# Patient Record
Sex: FEMALE | Race: WHITE | NOT HISPANIC OR LATINO
[De-identification: names, ages, dates, MRNs, and addresses within clinical notes are randomized per-mention and may not be internally consistent; named-entity substitution may affect disease eponyms.]

---

## 2017-02-16 ENCOUNTER — APPOINTMENT (OUTPATIENT)
Dept: UROGYNECOLOGY | Facility: CLINIC | Age: 67
End: 2017-02-16

## 2017-05-16 ENCOUNTER — OTHER (OUTPATIENT)
Age: 67
End: 2017-05-16

## 2017-05-23 LAB
25(OH)D3 SERPL-MCNC: 44.6 NG/ML
ALBUMIN SERPL ELPH-MCNC: 4 G/DL
ALP BLD-CCNC: 81 U/L
ALT SERPL-CCNC: 31 U/L
ANION GAP SERPL CALC-SCNC: 16 MMOL/L
AST SERPL-CCNC: 33 U/L
BASOPHILS # BLD AUTO: 0.03 K/UL
BASOPHILS NFR BLD AUTO: 0.4 %
BILIRUB SERPL-MCNC: 0.5 MG/DL
BUN SERPL-MCNC: 16 MG/DL
CALCIUM SERPL-MCNC: 9.5 MG/DL
CHLORIDE SERPL-SCNC: 103 MMOL/L
CHOLEST SERPL-MCNC: 164 MG/DL
CHOLEST/HDLC SERPL: 2.6 RATIO
CO2 SERPL-SCNC: 23 MMOL/L
CREAT SERPL-MCNC: 0.6 MG/DL
EOSINOPHIL # BLD AUTO: 0.12 K/UL
EOSINOPHIL NFR BLD AUTO: 1.8 %
GLUCOSE SERPL-MCNC: 85 MG/DL
HCT VFR BLD CALC: 45.7 %
HDLC SERPL-MCNC: 64 MG/DL
HGB BLD-MCNC: 14.8 G/DL
IMM GRANULOCYTES NFR BLD AUTO: 0.1 %
LDLC SERPL CALC-MCNC: 85 MG/DL
LYMPHOCYTES # BLD AUTO: 1.65 K/UL
LYMPHOCYTES NFR BLD AUTO: 24.3 %
MAN DIFF?: NORMAL
MCHC RBC-ENTMCNC: 29.7 PG
MCHC RBC-ENTMCNC: 32.4 GM/DL
MCV RBC AUTO: 91.6 FL
MONOCYTES # BLD AUTO: 0.71 K/UL
MONOCYTES NFR BLD AUTO: 10.5 %
NEUTROPHILS # BLD AUTO: 4.26 K/UL
NEUTROPHILS NFR BLD AUTO: 62.9 %
PLATELET # BLD AUTO: 284 K/UL
POTASSIUM SERPL-SCNC: 5.5 MMOL/L
PROT SERPL-MCNC: 7 G/DL
RBC # BLD: 4.99 M/UL
RBC # FLD: 13.4 %
SODIUM SERPL-SCNC: 142 MMOL/L
TRIGL SERPL-MCNC: 75 MG/DL
WBC # FLD AUTO: 6.78 K/UL

## 2017-06-12 ENCOUNTER — APPOINTMENT (OUTPATIENT)
Dept: CARDIOLOGY | Facility: CLINIC | Age: 67
End: 2017-06-12

## 2017-06-12 ENCOUNTER — NON-APPOINTMENT (OUTPATIENT)
Age: 67
End: 2017-06-12

## 2017-06-12 VITALS
HEART RATE: 91 BPM | SYSTOLIC BLOOD PRESSURE: 159 MMHG | HEIGHT: 65 IN | OXYGEN SATURATION: 98 % | DIASTOLIC BLOOD PRESSURE: 83 MMHG | BODY MASS INDEX: 29.07 KG/M2 | WEIGHT: 174.5 LBS

## 2017-06-12 VITALS — DIASTOLIC BLOOD PRESSURE: 70 MMHG | SYSTOLIC BLOOD PRESSURE: 120 MMHG

## 2017-06-12 DIAGNOSIS — I65.23 OCCLUSION AND STENOSIS OF BILATERAL CAROTID ARTERIES: ICD-10-CM

## 2017-09-05 ENCOUNTER — MEDICATION RENEWAL (OUTPATIENT)
Age: 67
End: 2017-09-05

## 2017-09-21 ENCOUNTER — MEDICATION RENEWAL (OUTPATIENT)
Age: 67
End: 2017-09-21

## 2018-01-23 ENCOUNTER — APPOINTMENT (OUTPATIENT)
Dept: CARDIOLOGY | Facility: CLINIC | Age: 68
End: 2018-01-23
Payer: MEDICARE

## 2018-01-23 ENCOUNTER — NON-APPOINTMENT (OUTPATIENT)
Age: 68
End: 2018-01-23

## 2018-01-23 VITALS
OXYGEN SATURATION: 98 % | SYSTOLIC BLOOD PRESSURE: 149 MMHG | HEIGHT: 65 IN | HEART RATE: 74 BPM | DIASTOLIC BLOOD PRESSURE: 86 MMHG

## 2018-01-23 VITALS — SYSTOLIC BLOOD PRESSURE: 130 MMHG | DIASTOLIC BLOOD PRESSURE: 72 MMHG

## 2018-01-23 PROCEDURE — 93000 ELECTROCARDIOGRAM COMPLETE: CPT

## 2018-01-23 PROCEDURE — 99215 OFFICE O/P EST HI 40 MIN: CPT

## 2018-01-24 LAB
25(OH)D3 SERPL-MCNC: 52.7 NG/ML
BASOPHILS # BLD AUTO: 0.04 K/UL
BASOPHILS NFR BLD AUTO: 0.5 %
EOSINOPHIL # BLD AUTO: 0.1 K/UL
EOSINOPHIL NFR BLD AUTO: 1.3 %
HCT VFR BLD CALC: 45.5 %
HGB BLD-MCNC: 15.3 G/DL
IMM GRANULOCYTES NFR BLD AUTO: 0.1 %
LYMPHOCYTES # BLD AUTO: 1.99 K/UL
LYMPHOCYTES NFR BLD AUTO: 26.8 %
MAN DIFF?: NORMAL
MCHC RBC-ENTMCNC: 30.2 PG
MCHC RBC-ENTMCNC: 33.6 GM/DL
MCV RBC AUTO: 89.7 FL
MONOCYTES # BLD AUTO: 0.6 K/UL
MONOCYTES NFR BLD AUTO: 8.1 %
NEUTROPHILS # BLD AUTO: 4.68 K/UL
NEUTROPHILS NFR BLD AUTO: 63.2 %
PLATELET # BLD AUTO: 252 K/UL
RBC # BLD: 5.07 M/UL
RBC # FLD: 13.5 %
WBC # FLD AUTO: 7.42 K/UL

## 2018-02-12 ENCOUNTER — LABORATORY RESULT (OUTPATIENT)
Age: 68
End: 2018-02-12

## 2018-05-21 ENCOUNTER — OTHER (OUTPATIENT)
Age: 68
End: 2018-05-21

## 2018-05-29 ENCOUNTER — RX RENEWAL (OUTPATIENT)
Age: 68
End: 2018-05-29

## 2018-05-31 ENCOUNTER — APPOINTMENT (OUTPATIENT)
Dept: CARDIOLOGY | Facility: CLINIC | Age: 68
End: 2018-05-31
Payer: MEDICARE

## 2018-05-31 PROCEDURE — 93224 XTRNL ECG REC UP TO 48 HRS: CPT

## 2018-06-11 ENCOUNTER — APPOINTMENT (OUTPATIENT)
Dept: CARDIOLOGY | Facility: CLINIC | Age: 68
End: 2018-06-11
Payer: MEDICARE

## 2018-06-11 VITALS — RESPIRATION RATE: 14 BRPM | DIASTOLIC BLOOD PRESSURE: 78 MMHG | HEART RATE: 76 BPM | SYSTOLIC BLOOD PRESSURE: 130 MMHG

## 2018-06-11 PROCEDURE — 99215 OFFICE O/P EST HI 40 MIN: CPT

## 2018-09-14 ENCOUNTER — RX RENEWAL (OUTPATIENT)
Age: 68
End: 2018-09-14

## 2018-09-24 ENCOUNTER — RX RENEWAL (OUTPATIENT)
Age: 68
End: 2018-09-24

## 2018-12-05 ENCOUNTER — OTHER (OUTPATIENT)
Age: 68
End: 2018-12-05

## 2018-12-11 ENCOUNTER — APPOINTMENT (OUTPATIENT)
Dept: CARDIOLOGY | Facility: CLINIC | Age: 68
End: 2018-12-11
Payer: MEDICARE

## 2018-12-11 ENCOUNTER — NON-APPOINTMENT (OUTPATIENT)
Age: 68
End: 2018-12-11

## 2018-12-11 VITALS
DIASTOLIC BLOOD PRESSURE: 95 MMHG | SYSTOLIC BLOOD PRESSURE: 167 MMHG | HEIGHT: 65 IN | OXYGEN SATURATION: 100 % | HEART RATE: 70 BPM

## 2018-12-11 VITALS — SYSTOLIC BLOOD PRESSURE: 140 MMHG | DIASTOLIC BLOOD PRESSURE: 90 MMHG

## 2018-12-11 DIAGNOSIS — F41.9 ANXIETY DISORDER, UNSPECIFIED: ICD-10-CM

## 2018-12-11 LAB
25(OH)D3 SERPL-MCNC: 56.1 NG/ML
ALBUMIN SERPL ELPH-MCNC: 4.3 G/DL
ALP BLD-CCNC: 90 U/L
ALT SERPL-CCNC: 27 U/L
ANION GAP SERPL CALC-SCNC: 12 MMOL/L
AST SERPL-CCNC: 21 U/L
BILIRUB SERPL-MCNC: 0.6 MG/DL
BUN SERPL-MCNC: 16 MG/DL
CALCIUM SERPL-MCNC: 10.1 MG/DL
CHLORIDE SERPL-SCNC: 101 MMOL/L
CHOLEST SERPL-MCNC: 177 MG/DL
CHOLEST/HDLC SERPL: 2.9 RATIO
CO2 SERPL-SCNC: 29 MMOL/L
CREAT SERPL-MCNC: 0.66 MG/DL
GLUCOSE SERPL-MCNC: 99 MG/DL
HDLC SERPL-MCNC: 62 MG/DL
LDLC SERPL CALC-MCNC: 94 MG/DL
POTASSIUM SERPL-SCNC: 5.1 MMOL/L
PROT SERPL-MCNC: 7 G/DL
SODIUM SERPL-SCNC: 142 MMOL/L
TRIGL SERPL-MCNC: 103 MG/DL

## 2018-12-11 PROCEDURE — 99215 OFFICE O/P EST HI 40 MIN: CPT

## 2018-12-11 PROCEDURE — 93000 ELECTROCARDIOGRAM COMPLETE: CPT

## 2018-12-11 RX ORDER — SERTRALINE HYDROCHLORIDE 50 MG/1
50 TABLET, FILM COATED ORAL DAILY
Refills: 0 | Status: ACTIVE | COMMUNITY

## 2018-12-11 NOTE — REASON FOR VISIT
[Hyperlipidemia] : hyperlipidemia [Hypertension] : hypertension [Palpitations] : palpitations [FreeTextEntry1] : ventricular ectopy

## 2018-12-11 NOTE — PHYSICAL EXAM
[General Appearance - In No Acute Distress] : no acute distress [Normal Conjunctiva] : the conjunctiva exhibited no abnormalities [No Oral Pallor] : no oral pallor [Normal Jugular Venous A Waves Present] : normal jugular venous A waves present [Normal Jugular Venous V Waves Present] : normal jugular venous V waves present [No Jugular Venous Womack A Waves] : no jugular venous womack A waves [Respiration, Rhythm And Depth] : normal respiratory rhythm and effort [Auscultation Breath Sounds / Voice Sounds] : lungs were clear to auscultation bilaterally [Bowel Sounds] : normal bowel sounds [Abdomen Tenderness] : non-tender [Abnormal Walk] : normal gait [Cyanosis, Localized] : no localized cyanosis [] : no rash [Oriented To Time, Place, And Person] : oriented to person, place, and time [Not Palpable] : not palpable [No Precordial Heave] : no precordial heave was noted [Normal Rate] : normal [Rhythm Regular] : regular [Normal S1] : normal S1 [Normal S2] : normal S2 [No Gallop] : no gallop heard [No Murmur] : no murmurs heard [2+] : left 2+ [No Abnormalities] : the abdominal aorta was not enlarged and no bruit was heard [No Pitting Edema] : no pitting edema present [FreeTextEntry1] : mildly overweight white female [Apical Thrill] : no thrill palpable at the apex [Click] : no click [Pericardial Rub] : no pericardial rub [Right Carotid Bruit] : no bruit heard over the right carotid [Left Carotid Bruit] : no bruit heard over the left carotid

## 2018-12-11 NOTE — DISCUSSION/SUMMARY
[FreeTextEntry1] : Mrs. Lowry has been stable from a cardiac symptomatic standpoint since her previous visit here on 6/11/18. Specifically, she has continued to experience randomly-occurring momentary palpitations, which have previously been found to correlate with supraventricular and/or ventricular ectopy. She attributes these palpitations to an increased degree of stress in her life. The patient has not experienced any signs or symptoms to suggest the development of a hemodynamically-compromising arrhythmia, an anginal syndrome, or congestive heart failure. Her cardiac examination today is unremarkable, and unchanged from her previous visit with me. Her blood pressure reading today is moderately elevated. Her electrocardiogram today reveals sinus rhythm with a right bundle branch block pattern, essentially unchanged from her previous office tracing.\par \par I have instructed the patient increase the dosage of Toprol-XL to 50 mg daily, in an effort to more optimally control her blood pressure, as well as to potentially suppress her palpitations. I have electronically prescribed the higher dosage of this medication to her pharmacy for her today, and I have asked her to call me if she should have any difficulty tolerating the increased dosage of this medication.\par \par I have again reassured the patient today that her palpitations appear to be related to supraventricular and/or ventricular premature contractions, which, in all likelihood, are benign in nature.\par \par I reviewed the findings of the cardiac rhythm event monitoring session of 12/29/15 through 1/4/16 and the Holter monitor study of 5/31/18 in detail with the patient today.\par \par I have reviewed the findings of the carotid artery Doppler study of 5/29/15 in detail with the patient.\par \par The persistent right bundle branch block pattern on the patient's electrocardiogram is considered a non-diagnostic finding in this patient, possibly representing an early manifestation of conduction system disease. Note that echocardiography did not demonstrate significant structural heart disease, and previous nuclear stress testing did not demonstrate evidence for underlying ischemic heart disease.\par \par The patient had follow-up blood testing performed in our office this morning, including a fasting lipid profile and chemistry screen, and I will telephone her to discuss the results, once they're available. In the interim, I have instructed her to continue Zocor at a dosage of 40 mg daily.\par \par The importance of proper dietary habits, weight loss, and regular exercise was again emphasized to the patient today.\par \par   I have asked the patient to call me if she should have any questions or problems pertaining to these matters, and especially if she should experience any concerning symptoms. I have otherwise asked her to return to the office for follow-up cardiac evaluation and blood pressure reassessment in one month, provided she remains clinically stable in the interim.

## 2018-12-11 NOTE — HISTORY OF PRESENT ILLNESS
[FreeTextEntry1] : Mrs. Flakita Lowry presented to the office today for follow-up cardiac evaluation.\par \par The patient is a 68-year-old female with a history of labile hypertension, a dyslipidemia, a right bundle branch block pattern on her electrocardiogram (discovered on a routine tracing performed at her internist's office on 5/26/11), mild carotid atherosclerosis, ventricular premature contractions, palpitations (suppressed with beta-blocker therapy), atypical chest discomfort (attributed to GERD), GERD, a hiatal hernia, insomnia, and vitamin D deficiency. I last evaluated the patient in the office on 6/11/18.\par \par The patient has been stable from a cardiac symptomatic standpoint since her previous visit here on 6/11/18. Specifically, she has continued to experience randomly-occurring momentary palpitations on a daily basis, which she attributes to stress and anxiety. She has not experienced any sustained episodes of palpitations. She has not experienced any episodes of presyncope or syncope. She has not experienced chest discomfort or dyspnea on exertion in association with her activities, which include walking 3 miles daily. She has not noted orthopnea, paroxysmal nocturnal dyspnea, or lower extremity edema.\par \par A Holter monitor study performed from 5/31/18 through 6/1/18 revealed the predominant rhythm throughout the recording to be sinus rhythm at an average rate of 76 beats per minute, with a range of  beats per minute. Rare supraventricular premature contractions were exhibited, including 4 couplets. 2 brief episodes of non-sustained supraventricular tachycardia were exhibited, the longest of which was 4 beats in duration. Rare ventricular premature contractions were exhibited. No episodes of ventricular tachycardia were exhibited. No significant pauses were exhibited.\par \par Laboratory studies performed on 2/12/18 revealed cholesterol 154, triglycerides 92, HDL 53, and calculated LDL 83. The liver chemistries were normal. The BUN and creatinine were 19 and 0.66, respectively. The potassium level was 5.1. The vitamin D level was 46.9. The hemoglobin and hematocrit were 14.3 and 43.4, respectively.\par \par Previous History:\par \par At the time of a previous visit here on 12/28/15, the patient reported that over the preceding few months, she had been experiencing recurrence of randomly-occurring palpitations, described as momentary "loud bumps". Subsequent cardiac rhythm event monitoring performed from 12/29/15 through 1/4/16 revealed palpitations on 12/29/15 to have correlated with isolated ventricular premature contractions. Note that I had instructed the patient to increase her dosage of Toprol-XL from 25 mg daily to 50 mg daily at the time of a previous visit here on 12/28/15, in an effort to suppress her palpitations, as well as to more optimally control her blood pressure. I spoke with her subsequently on the telephone on 1/19/17, at which time she informed me that she had become fatigued on the higher doses of Toprol-XL, and had reduced the dosage back down to 25 mg daily, with resolution of the fatigue. I instructed her to remain on Toprol-XL 25 mg daily at that point.\par \par When I had evaluated the patient in the office on 4/22/13, she described experiencing persistent palpitations at that time, described as a sensation of a "hard beat". Her blood pressure was noted to be persistently elevated at that time as well. I prescribed Toprol-XL 25 mg daily at bedtime, in an effort to more optimally control the patient's blood pressure, as well as to suppress her palpitations. Note that I suspected that the patient's palpitations were secondary to ventricular ectopy, as a ventricular premature contraction was noted on her electrocardiogram at the time of a previous visit here on 4/1/13.\par \par Echocardiography performed on 4/10/13 revealed normal cardiac structural integrity. There was normal left ventricular systolic function, with an estimated ejection fraction of 77%. There was evidence for mildly impaired diastolic relaxation the left ventricle. Mild tricuspid regurgitation was demonstrated, without significant elevation in the pulmonary artery systolic pressure.\par \par  Carotid artery Doppler testing performed in our office on 5/29/15 revealed mild plaque formation involving the right bulbar region.\par \par   As far as risk factors for coronary artery disease are concerned, the patient has a history of a type IIa dyslipidemia, being treated with statin therapy. She has exhibited labile hypertensive readings on occasion, however, states that she does not typically exhibit hypertension. She does not have a history of diabetes. She denies any history of cigarette smoking. She does not have a known definite immediate family history of premature coronary artery disease.\par \par A  nuclear stress study performed on 5/14/09, for further evaluation of atypical chest discomfort at that time, revealed the patient to exhibit normal exercise tolerance, without the inducement of cardiac symptoms. The electrocardiographic portion of the study was considered non-diagnostic. The cardiac imaging portion of the study revealed normal left ventricular myocardial perfusion and systolic function, with a calculated ejection fraction of 80%.\par \par   Past medical history, other than previously stated, is significant for vitamin D deficiency, left knee arthroscopic surgery for treatment of a torn meniscus in September of 2011, osteoarthritis of the left knee, a left Baker's cyst, a LEATHA/BSO procedure in the 1990's for treatment of uterine fibroids, resection of a benign left breast tumor in 1988, and a tonsillectomy at age 2. The patient has had 3 full-term uncomplicated pregnancies, all delivered vaginally.

## 2019-01-08 ENCOUNTER — APPOINTMENT (OUTPATIENT)
Dept: CARDIOLOGY | Facility: CLINIC | Age: 69
End: 2019-01-08
Payer: MEDICARE

## 2019-01-08 VITALS
OXYGEN SATURATION: 99 % | SYSTOLIC BLOOD PRESSURE: 154 MMHG | HEART RATE: 64 BPM | WEIGHT: 170 LBS | DIASTOLIC BLOOD PRESSURE: 82 MMHG | BODY MASS INDEX: 28.32 KG/M2 | HEIGHT: 65 IN

## 2019-01-08 VITALS — DIASTOLIC BLOOD PRESSURE: 80 MMHG | SYSTOLIC BLOOD PRESSURE: 150 MMHG

## 2019-01-08 PROCEDURE — 99215 OFFICE O/P EST HI 40 MIN: CPT

## 2019-01-08 NOTE — DISCUSSION/SUMMARY
[FreeTextEntry1] : Mrs. Lowry is clinically improved since her dosage of Toprol-XL was increased to 50 mg daily at the time of her previous visit here on 12/11/18, noting that her palpitations have significantly decreased in frequency, to the point where she now describes experiencing relatively infrequent momentary palpitations. The patient has not experienced any signs or symptoms to suggest the development of a hemodynamically-compromising arrhythmia, an anginal syndrome, or congestive heart failure. Her cardiac examination today is unremarkable, and unchanged from her previous visit with me. Her blood pressure reading today is moderately elevated. Her electrocardiogram at the time of her previous visit here on 12/11/18 revealed sinus rhythm with a right bundle branch block pattern, essentially unchanged from her previous office tracing.\par \par In an effort to more optimally control her blood pressure, I am going to add Benicar 20 mg daily to the patient's antihypertensive medication regimen. I have electronically prescribed this medication to her pharmacy for her today, and I have asked her to call me if she should have any difficulty tolerating the increased dosage of this medication. I have instructed her to continue Toprol-XL at a dosage of 50 mg daily for the time being.\par \par I have again reassured the patient today that her palpitations appear to be related to supraventricular and/or ventricular premature contractions, which, in all likelihood, are benign in nature.\par \par I reviewed the findings of the cardiac rhythm event monitoring session of 12/29/15 through 1/4/16 and the Holter monitor study of 5/31/18 in detail with the patient today.\par \par I have reviewed the findings of the carotid artery Doppler study of 5/29/15 in detail with the patient.\par \par The persistent right bundle branch block pattern on the patient's electrocardiogram is considered a non-diagnostic finding in this patient, possibly representing an early manifestation of conduction system disease. Note that echocardiography did not demonstrate significant structural heart disease, and previous nuclear stress testing did not demonstrate evidence for underlying ischemic heart disease.\par \par I have reviewed the findings of the blood test report on 12/11/18 in detail with the patient today, and I have instructed her to continue Zocor at a dosage of 40 mg daily for the time being.\par \par The importance of proper dietary habits, weight loss, and regular exercise was again emphasized to the patient today.\par \par   I have asked the patient to call me if she should have any questions or problems pertaining to these matters, and especially if she should experience any concerning symptoms. I have otherwise asked her to return to the office for follow-up cardiac evaluation and blood pressure reassessment in 3 weeks (prior to leaving to [place] for a period of 2 months), provided she remains clinically stable in the interim.

## 2019-01-08 NOTE — HISTORY OF PRESENT ILLNESS
[FreeTextEntry1] : Mrs. Flakita Lowry presented to the office today for follow-up cardiac evaluation.\par \par The patient is a 68-year-old female with a history of labile hypertension, a dyslipidemia, a right bundle branch block pattern on her electrocardiogram (discovered on a routine tracing performed at her internist's office on 5/26/11), mild carotid atherosclerosis, ventricular premature contractions, palpitations (suppressed with beta-blocker therapy), atypical chest discomfort (attributed to GERD), GERD, a hiatal hernia, insomnia, and vitamin D deficiency. I last evaluated the patient in the office on 12/11/18.\par \par At the time of the patient's previous visit here on 12/11/18, I instructed her to increase her dosage of Toprol-XL from 25 mg daily to 50 mg daily, in an effort to more optimally controlled her blood pressure, as well as to potentially suppress her palpitations. She has been tolerating the increased dosage of the Toprol-XL, and has noted her palpitations to have significantly decreased in frequency and duration.\par \par The patient has continued to experience randomly-occurring momentary palpitations (which she attributes to stress and anxiety), however, with significantly decreased frequency since the dosage of Toprol-XL was increased on 12/11/18, to the point where the palpitations are relatively infrequent. She has not experienced any sustained episodes of palpitations. She has not experienced any episodes of presyncope or syncope. She has not experienced chest discomfort or dyspnea on exertion in association with her activities, which include walking 3 miles daily. She has not noted orthopnea, paroxysmal nocturnal dyspnea, or lower extremity edema.\par \par A Holter monitor study performed from 5/31/18 through 6/1/18 revealed the predominant rhythm throughout the recording to be sinus rhythm at an average rate of 76 beats per minute, with a range of  beats per minute. Rare supraventricular premature contractions were exhibited, including 4 couplets. 2 brief episodes of non-sustained supraventricular tachycardia were exhibited, the longest of which was 4 beats in duration. Rare ventricular premature contractions were exhibited. No episodes of ventricular tachycardia were exhibited. No significant pauses were exhibited.\par \par Laboratory studies performed on 12/11/18 revealed cholesterol 177, triglycerides 103, HDL 62, and calculated LDL 94. The liver chemistries were normal. The BUN and creatinine were 16 and 0.66, respectively. The potassium level was 5.1. The glucose level was 99. The vitamin D level was 56.1.\par \par Previous History:\par \par At the time of a previous visit here on 12/28/15, the patient reported that over the preceding few months, she had been experiencing recurrence of randomly-occurring palpitations, described as momentary "loud bumps". Subsequent cardiac rhythm event monitoring performed from 12/29/15 through 1/4/16 revealed palpitations on 12/29/15 to have correlated with isolated ventricular premature contractions. Note that I had instructed the patient to increase her dosage of Toprol-XL from 25 mg daily to 50 mg daily at the time of a previous visit here on 12/28/15, in an effort to suppress her palpitations, as well as to more optimally control her blood pressure. I spoke with her subsequently on the telephone on 1/19/17, at which time she informed me that she had become fatigued on the higher doses of Toprol-XL, and had reduced the dosage back down to 25 mg daily, with resolution of the fatigue. I instructed her to remain on Toprol-XL 25 mg daily at that point.\par \par When I had evaluated the patient in the office on 4/22/13, she described experiencing persistent palpitations at that time, described as a sensation of a "hard beat". Her blood pressure was noted to be persistently elevated at that time as well. I prescribed Toprol-XL 25 mg daily at bedtime, in an effort to more optimally control the patient's blood pressure, as well as to suppress her palpitations. Note that I suspected that the patient's palpitations were secondary to ventricular ectopy, as a ventricular premature contraction was noted on her electrocardiogram at the time of a previous visit here on 4/1/13.\par \par Echocardiography performed on 4/10/13 revealed normal cardiac structural integrity. There was normal left ventricular systolic function, with an estimated ejection fraction of 77%. There was evidence for mildly impaired diastolic relaxation the left ventricle. Mild tricuspid regurgitation was demonstrated, without significant elevation in the pulmonary artery systolic pressure.\par \par  Carotid artery Doppler testing performed in our office on 5/29/15 revealed mild plaque formation involving the right bulbar region.\par \par   As far as risk factors for coronary artery disease are concerned, the patient has a history of a type IIa dyslipidemia, being treated with statin therapy. She has exhibited labile hypertensive readings on occasion, however, states that she does not typically exhibit hypertension. She does not have a history of diabetes. She denies any history of cigarette smoking. She does not have a known definite immediate family history of premature coronary artery disease.\par \par A  nuclear stress study performed on 5/14/09, for further evaluation of atypical chest discomfort at that time, revealed the patient to exhibit normal exercise tolerance, without the inducement of cardiac symptoms. The electrocardiographic portion of the study was considered non-diagnostic. The cardiac imaging portion of the study revealed normal left ventricular myocardial perfusion and systolic function, with a calculated ejection fraction of 80%.\par \par   Past medical history, other than previously stated, is significant for vitamin D deficiency, left knee arthroscopic surgery for treatment of a torn meniscus in September of 2011, osteoarthritis of the left knee, a left Baker's cyst, a LEATHA/BSO procedure in the 1990's for treatment of uterine fibroids, resection of a benign left breast tumor in 1988, and a tonsillectomy at age 2. The patient has had 3 full-term uncomplicated pregnancies, all delivered vaginally.

## 2019-01-29 ENCOUNTER — APPOINTMENT (OUTPATIENT)
Dept: CARDIOLOGY | Facility: CLINIC | Age: 69
End: 2019-01-29

## 2019-01-29 ENCOUNTER — APPOINTMENT (OUTPATIENT)
Dept: UROGYNECOLOGY | Facility: CLINIC | Age: 69
End: 2019-01-29
Payer: MEDICARE

## 2019-01-29 DIAGNOSIS — M62.89 OTHER SPECIFIED DISORDERS OF MUSCLE: ICD-10-CM

## 2019-01-29 PROCEDURE — 99213 OFFICE O/P EST LOW 20 MIN: CPT

## 2019-01-29 NOTE — HISTORY OF PRESENT ILLNESS
[FreeTextEntry1] : Patient with pelvic pain cured with skelaxin.  Referral provided.  exam unchanged from previous.  Risks and adverse reactions of meds reviewed.   Patient will likely continue care in [place].

## 2019-01-30 ENCOUNTER — APPOINTMENT (OUTPATIENT)
Dept: CARDIOLOGY | Facility: CLINIC | Age: 69
End: 2019-01-30
Payer: MEDICARE

## 2019-01-30 VITALS
OXYGEN SATURATION: 95 % | HEIGHT: 65 IN | HEART RATE: 70 BPM | SYSTOLIC BLOOD PRESSURE: 148 MMHG | DIASTOLIC BLOOD PRESSURE: 76 MMHG

## 2019-01-30 PROCEDURE — 99215 OFFICE O/P EST HI 40 MIN: CPT

## 2019-01-30 NOTE — DISCUSSION/SUMMARY
[FreeTextEntry1] : Mrs. Lowry is clinically improved since her dosage of Toprol-XL was increased to 50 mg daily at the time of her visit here on 12/11/18, noting that her palpitations have significantly decreased in frequency, to the point where she now describes experiencing relatively infrequent momentary palpitations. The patient has not experienced any signs or symptoms to suggest the development of a hemodynamically-compromising arrhythmia, an anginal syndrome, or congestive heart failure. Her cardiac examination today is unremarkable, and unchanged from her previous visit with me. Her blood pressure reading today is mildly elevated. Her electrocardiogram at the time of a previous visit here on 12/11/18 revealed sinus rhythm with a right bundle branch block pattern, essentially unchanged from her previous office tracing.\par \par In an effort to more optimally control her blood pressure, I have instructed the patient to increase the dosage of Benicar to 40 mg daily. I have electronically prescribed the higher dosage of this medication to her pharmacy for her today, and I have asked her to call me if she should have any difficulty tolerating the increased dosage of this medication. I have instructed her to continue Toprol-XL at a dosage of 50 mg daily for the time being.\par \par I have again reassured the patient today that her palpitations appear to be related to supraventricular and/or ventricular premature contractions, which, in all likelihood, are benign in nature.\par \par I reviewed the findings of the cardiac rhythm event monitoring session of 12/29/15 through 1/4/16 and the Holter monitor study of 5/31/18 in detail with the patient today.\par \par I have reviewed the findings of the carotid artery Doppler study of 5/29/15 in detail with the patient.\par \par The persistent right bundle branch block pattern on the patient's electrocardiogram is considered a non-diagnostic finding in this patient, possibly representing an early manifestation of conduction system disease. Note that echocardiography did not demonstrate significant structural heart disease, and previous nuclear stress testing did not demonstrate evidence for underlying ischemic heart disease.\par \par I have reviewed the findings of the blood test report on 12/11/18 in detail with the patient today, and I have instructed her to continue Zocor at a dosage of 40 mg daily for the time being.\par \par The importance of proper dietary habits, weight loss, and regular exercise was again emphasized to the patient today.\par \par   I have asked the patient to call me if she should have any questions or problems pertaining to these matters, and especially if she should experience any concerning symptoms. I have otherwise asked her to return to the office for follow-up cardiac evaluation and blood pressure reassessment in 2 months (after returning from [place]), provided she remains clinically stable in the interim.

## 2019-01-30 NOTE — HISTORY OF PRESENT ILLNESS
[FreeTextEntry1] : Mrs. Flakita Lowry presented to the office today for follow-up cardiac evaluation.\par \par The patient is a 68-year-old female with a history of labile hypertension, a dyslipidemia, a right bundle branch block pattern on her electrocardiogram (discovered on a routine tracing performed at her internist's office on 5/26/11), mild carotid atherosclerosis, ventricular premature contractions, palpitations (suppressed with beta-blocker therapy), atypical chest discomfort (attributed to GERD), GERD, a hiatal hernia, insomnia, and vitamin D deficiency. I last evaluated the patient in the office on 1/8/19.\par \par At the time of a previous visit here on 12/11/18, I instructed the patient to increase her dosage of Toprol-XL from 25 mg daily to 50 mg daily, in an effort to more optimally control her blood pressure, as well as to potentially suppress her palpitations. She has been tolerating the increased dosage of the Toprol-XL, and has noted her palpitations to have significantly decreased in frequency and duration.  At the time of a follow-up visit here on 1/8/19, I added Benicar 20 mg daily to the patient's medication regimen, in an effort to control her blood pressure. Review of her home blood pressure readings since her previous visit here reveals the readings to have been labile, however, predominately running in the mildly elevated range.\par \par The patient has continued to experience randomly-occurring momentary palpitations (which she attributes to stress and anxiety), however, with significantly decreased frequency since the dosage of Toprol-XL was increased on 12/11/18, to the point where the palpitations are relatively infrequent. She has not experienced any sustained episodes of palpitations. She has not experienced any episodes of presyncope or syncope. She has not experienced chest discomfort or dyspnea on exertion in association with her activities, which include walking 3 miles daily. She has not noted orthopnea, paroxysmal nocturnal dyspnea, or lower extremity edema.\par \par A Holter monitor study performed from 5/31/18 through 6/1/18 revealed the predominant rhythm throughout the recording to be sinus rhythm at an average rate of 76 beats per minute, with a range of  beats per minute. Rare supraventricular premature contractions were exhibited, including 4 couplets. 2 brief episodes of non-sustained supraventricular tachycardia were exhibited, the longest of which was 4 beats in duration. Rare ventricular premature contractions were exhibited. No episodes of ventricular tachycardia were exhibited. No significant pauses were exhibited.\par \par Laboratory studies performed on 12/11/18 revealed cholesterol 177, triglycerides 103, HDL 62, and calculated LDL 94. The liver chemistries were normal. The BUN and creatinine were 16 and 0.66, respectively. The potassium level was 5.1. The glucose level was 99. The vitamin D level was 56.1.\par \par Previous History:\par \par At the time of a previous visit here on 12/28/15, the patient reported that over the preceding few months, she had been experiencing recurrence of randomly-occurring palpitations, described as momentary "loud bumps". Subsequent cardiac rhythm event monitoring performed from 12/29/15 through 1/4/16 revealed palpitations on 12/29/15 to have correlated with isolated ventricular premature contractions. Note that I had instructed the patient to increase her dosage of Toprol-XL from 25 mg daily to 50 mg daily at the time of a previous visit here on 12/28/15, in an effort to suppress her palpitations, as well as to more optimally control her blood pressure. I spoke with her subsequently on the telephone on 1/19/17, at which time she informed me that she had become fatigued on the higher doses of Toprol-XL, and had reduced the dosage back down to 25 mg daily, with resolution of the fatigue. I instructed her to remain on Toprol-XL 25 mg daily at that point.\par \par When I had evaluated the patient in the office on 4/22/13, she described experiencing persistent palpitations at that time, described as a sensation of a "hard beat". Her blood pressure was noted to be persistently elevated at that time as well. I prescribed Toprol-XL 25 mg daily at bedtime, in an effort to more optimally control the patient's blood pressure, as well as to suppress her palpitations. Note that I suspected that the patient's palpitations were secondary to ventricular ectopy, as a ventricular premature contraction was noted on her electrocardiogram at the time of a previous visit here on 4/1/13.\par \par Echocardiography performed on 4/10/13 revealed normal cardiac structural integrity. There was normal left ventricular systolic function, with an estimated ejection fraction of 77%. There was evidence for mildly impaired diastolic relaxation the left ventricle. Mild tricuspid regurgitation was demonstrated, without significant elevation in the pulmonary artery systolic pressure.\par \par  Carotid artery Doppler testing performed in our office on 5/29/15 revealed mild plaque formation involving the right bulbar region.\par \par   As far as risk factors for coronary artery disease are concerned, the patient has a history of a type IIa dyslipidemia, being treated with statin therapy. She has exhibited labile hypertensive readings on occasion, however, states that she does not typically exhibit hypertension. She does not have a history of diabetes. She denies any history of cigarette smoking. She does not have a known definite immediate family history of premature coronary artery disease.\par \par A  nuclear stress study performed on 5/14/09, for further evaluation of atypical chest discomfort at that time, revealed the patient to exhibit normal exercise tolerance, without the inducement of cardiac symptoms. The electrocardiographic portion of the study was considered non-diagnostic. The cardiac imaging portion of the study revealed normal left ventricular myocardial perfusion and systolic function, with a calculated ejection fraction of 80%.\par \par   Past medical history, other than previously stated, is significant for vitamin D deficiency, left knee arthroscopic surgery for treatment of a torn meniscus in September of 2011, osteoarthritis of the left knee, a left Baker's cyst, a LEATHA/BSO procedure in the 1990's for treatment of uterine fibroids, resection of a benign left breast tumor in 1988, and a tonsillectomy at age 2. The patient has had 3 full-term uncomplicated pregnancies, all delivered vaginally.

## 2019-04-30 ENCOUNTER — NON-APPOINTMENT (OUTPATIENT)
Age: 69
End: 2019-04-30

## 2019-04-30 ENCOUNTER — APPOINTMENT (OUTPATIENT)
Dept: CARDIOLOGY | Facility: CLINIC | Age: 69
End: 2019-04-30
Payer: MEDICARE

## 2019-04-30 VITALS
HEART RATE: 67 BPM | DIASTOLIC BLOOD PRESSURE: 84 MMHG | SYSTOLIC BLOOD PRESSURE: 152 MMHG | BODY MASS INDEX: 28.32 KG/M2 | HEIGHT: 65 IN | WEIGHT: 170 LBS | OXYGEN SATURATION: 98 %

## 2019-04-30 VITALS — DIASTOLIC BLOOD PRESSURE: 80 MMHG | SYSTOLIC BLOOD PRESSURE: 130 MMHG

## 2019-04-30 PROCEDURE — 93000 ELECTROCARDIOGRAM COMPLETE: CPT

## 2019-04-30 PROCEDURE — 99215 OFFICE O/P EST HI 40 MIN: CPT

## 2019-04-30 NOTE — HISTORY OF PRESENT ILLNESS
[FreeTextEntry1] : Mrs. Flakita Lowry presented to the office today for follow-up cardiac evaluation.\par \par The patient is a 69-year-old female with a history of labile hypertension, a dyslipidemia, a right bundle branch block pattern on her electrocardiogram (discovered on a routine tracing performed at her internist's office on 5/26/11), mild carotid atherosclerosis, ventricular premature contractions, palpitations (suppressed with beta-blocker therapy), atypical chest discomfort (attributed to GERD), GERD, a hiatal hernia, insomnia, and vitamin D deficiency. I last evaluated the patient in the office on 1/30/19.\par \par At the time of a previous visit here on 12/11/18, I instructed the patient to increase her dosage of Toprol-XL from 25 mg daily to 50 mg daily, in an effort to more optimally control her blood pressure, as well as to potentially suppress her palpitations. She has been tolerating the increased dosage of the Toprol-XL, and has noted her palpitations to have significantly decreased in frequency and duration.  At the time of a follow-up visit here on 1/8/19, I added Benicar 20 mg daily to the patient's medication regimen, in an effort to control her blood pressure. \par \par The dosage of Benicar was increased to 40 mg daily at the time of a follow-up visit here on 1/30/19, noting that the patient was exhibiting a moderately elevated blood pressure reading at that time.\par \par The patient tripped on her front stoop on 3/14/19, lacerating her knee. She required suturing, and when she presented for suturing, she was noted to have a blood pressure reading of 112/43. She telephoned our office, and spoke with my associate, Dr. Smith, who instructed her to reduce the dosage of Benicar to 20 mg daily at that point.\par \par The patient has been doing well from a cardiac symptomatic standpoint since her previous visit here in 1/30/19. Specifically, she has not experienced recurrence of previously reported momentary palpitations. She has not experienced any sustained episodes of palpitations. She has not experienced chest discomfort or dyspnea on exertion in association with her activities, which include walking 3 miles daily. She has not noted orthopnea, paroxysmal nocturnal dyspnea, or lower extremity edema. She has not experienced any episodes of presyncope or syncope.\par \par A Holter monitor study performed from 5/31/18 through 6/1/18 revealed the predominant rhythm throughout the recording to be sinus rhythm at an average rate of 76 beats per minute, with a range of  beats per minute. Rare supraventricular premature contractions were exhibited, including 4 couplets. 2 brief episodes of non-sustained supraventricular tachycardia were exhibited, the longest of which was 4 beats in duration. Rare ventricular premature contractions were exhibited. No episodes of ventricular tachycardia were exhibited. No significant pauses were exhibited.\par \par Laboratory studies performed on 12/11/18 revealed cholesterol 177, triglycerides 103, HDL 62, and calculated LDL 94. The liver chemistries were normal. The BUN and creatinine were 16 and 0.66, respectively. The potassium level was 5.1. The glucose level was 99. The vitamin D level was 56.1.\par \par Previous History:\par \par At the time of a previous visit here on 12/28/15, the patient reported that over the preceding few months, she had been experiencing recurrence of randomly-occurring palpitations, described as momentary "loud bumps". Subsequent cardiac rhythm event monitoring performed from 12/29/15 through 1/4/16 revealed palpitations on 12/29/15 to have correlated with isolated ventricular premature contractions. Note that I had instructed the patient to increase her dosage of Toprol-XL from 25 mg daily to 50 mg daily at the time of a previous visit here on 12/28/15, in an effort to suppress her palpitations, as well as to more optimally control her blood pressure. I spoke with her subsequently on the telephone on 1/19/17, at which time she informed me that she had become fatigued on the higher doses of Toprol-XL, and had reduced the dosage back down to 25 mg daily, with resolution of the fatigue. I instructed her to remain on Toprol-XL 25 mg daily at that point.\par \par When I had evaluated the patient in the office on 4/22/13, she described experiencing persistent palpitations at that time, described as a sensation of a "hard beat". Her blood pressure was noted to be persistently elevated at that time as well. I prescribed Toprol-XL 25 mg daily at bedtime, in an effort to more optimally control the patient's blood pressure, as well as to suppress her palpitations. Note that I suspected that the patient's palpitations were secondary to ventricular ectopy, as a ventricular premature contraction was noted on her electrocardiogram at the time of a previous visit here on 4/1/13.\par \par Echocardiography performed on 4/10/13 revealed normal cardiac structural integrity. There was normal left ventricular systolic function, with an estimated ejection fraction of 77%. There was evidence for mildly impaired diastolic relaxation the left ventricle. Mild tricuspid regurgitation was demonstrated, without significant elevation in the pulmonary artery systolic pressure.\par \par  Carotid artery Doppler testing performed in our office on 5/29/15 revealed mild plaque formation involving the right bulbar region.\par \par   As far as risk factors for coronary artery disease are concerned, the patient has a history of a type IIa dyslipidemia, being treated with statin therapy. She has exhibited labile hypertensive readings on occasion, however, states that she does not typically exhibit hypertension. She does not have a history of diabetes. She denies any history of cigarette smoking. She does not have a known definite immediate family history of premature coronary artery disease.\par \par A  nuclear stress study performed on 5/14/09, for further evaluation of atypical chest discomfort at that time, revealed the patient to exhibit normal exercise tolerance, without the inducement of cardiac symptoms. The electrocardiographic portion of the study was considered non-diagnostic. The cardiac imaging portion of the study revealed normal left ventricular myocardial perfusion and systolic function, with a calculated ejection fraction of 80%.\par \par   Past medical history, other than previously stated, is significant for vitamin D deficiency, left knee arthroscopic surgery for treatment of a torn meniscus in September of 2011, osteoarthritis of the left knee, a left Baker's cyst, a LEATHA/BSO procedure in the 1990's for treatment of uterine fibroids, resection of a benign left breast tumor in 1988, and a tonsillectomy at age 2. The patient has had 3 full-term uncomplicated pregnancies, all delivered vaginally.

## 2019-04-30 NOTE — DISCUSSION/SUMMARY
[FreeTextEntry1] : Mrs. Lowry is clinically improved since her dosage of Toprol-XL was increased to 50 mg daily at the time of a visit here on 12/11/18, noting that her palpitations have significantly decreased in frequency, to the point where she reports not having experienced recurrence of palpitations since her previous visit here on 1/30/19. The patient has not experienced any signs or symptoms to suggest the development of a hemodynamically-compromising arrhythmia, an anginal syndrome, or congestive heart failure. Her cardiac examination today is unremarkable, and unchanged from her previous visit with me. Her blood pressure reading was moderately elevated upon initial presentation to the office today, however, a follow-up measurement obtained at her examination revealed the rhythm to be satisfactory. Her echocardiogram today reveals sinus rhythm at a rate of 64 beats per minute with a right bundle branch block pattern, essentially unchanged from her previous office tracing.\par \par As her blood pressure reading today is satisfactory, I instructed the patient to continue Benicar at a dosage of 20 mg daily for the time being, as well as Toprol-XL 50 mg daily.\par \par I have again reassured the patient today that her palpitations appear to be related to supraventricular and/or ventricular premature contractions, which, in all likelihood, are benign in nature.\par \par I reviewed the findings of the cardiac rhythm event monitoring session of 12/29/15 through 1/4/16 and the Holter monitor study of 5/31/18 in detail with the patient today.\par \par I have reviewed the findings of the carotid artery Doppler study of 5/29/15 in detail with the patient.\par \par The persistent right bundle branch block pattern on the patient's electrocardiogram is considered a non-diagnostic finding in this patient, possibly representing an early manifestation of conduction system disease. Note that echocardiography did not demonstrate significant structural heart disease, and previous nuclear stress testing did not demonstrate evidence for underlying ischemic heart disease.\par \par I have reviewed the findings of the blood test report on 12/11/18 in detail with the patient today, and I have instructed her to continue Zocor at a dosage of 40 mg daily for the time being. 5 the blood testing will be planned for June of 2019 for reassessment.\par \par The importance of proper dietary habits, weight loss, and regular exercise was again emphasized to the patient today.\par \par   I have asked the patient to call me if she should have any questions or problems pertaining to these matters, and especially if she should experience any concerning symptoms. I have otherwise asked her to return to the office for follow-up cardiac evaluation and blood pressure reassessment in 6 months, provided she remains clinically stable in the interim.

## 2019-06-19 ENCOUNTER — APPOINTMENT (OUTPATIENT)
Dept: CARDIOLOGY | Facility: CLINIC | Age: 69
End: 2019-06-19
Payer: MEDICARE

## 2019-06-19 ENCOUNTER — NON-APPOINTMENT (OUTPATIENT)
Age: 69
End: 2019-06-19

## 2019-06-19 VITALS
HEART RATE: 65 BPM | BODY MASS INDEX: 28.32 KG/M2 | OXYGEN SATURATION: 96 % | HEIGHT: 65 IN | WEIGHT: 170 LBS | DIASTOLIC BLOOD PRESSURE: 80 MMHG | SYSTOLIC BLOOD PRESSURE: 155 MMHG

## 2019-06-19 PROCEDURE — 99215 OFFICE O/P EST HI 40 MIN: CPT

## 2019-06-19 PROCEDURE — 93000 ELECTROCARDIOGRAM COMPLETE: CPT

## 2019-06-19 NOTE — DISCUSSION/SUMMARY
[FreeTextEntry1] : Mrs. Lowry is clinically improved since her dosage of Toprol-XL was increased to 50 mg daily at the time of a visit here on 12/11/18, noting that her palpitations have significantly decreased in frequency, to the point where she reports not having experienced recurrence of palpitations since her previous visit here on 4/30/19. The patient has not experienced any signs or symptoms to suggest the development of a hemodynamically-compromising arrhythmia, an anginal syndrome, or congestive heart failure. Her cardiac examination today is unremarkable, and unchanged from her previous visit with me. Her blood pressure reading was moderately elevated upon initial presentation to the office today, however, a follow-up measurement obtained at her examination revealed the rhythm to be normal. Her electrocardiogram today reveals sinus rhythm at a rate of 65 beats per minute with a right bundle branch block pattern and non-diagnostic repolarization abnormalities, essentially unchanged from her previous office tracing.\par \par As her blood pressure reading today is normal, I have instructed the patient to continue Benicar at a dosage of 20 mg daily for the time being, as well as Toprol-XL 50 mg daily.\par \par I have again reassured the patient today that her palpitations appear to be related to supraventricular and/or ventricular premature contractions, which, in all likelihood, are benign in nature.\par \par I reviewed the findings of the cardiac rhythm event monitoring session of 12/29/15 through 1/4/16 and the Holter monitor study of 5/31/18 in detail with the patient today.\par \par I have reviewed the findings of the carotid artery Doppler study of 5/29/15 in detail with the patient.\par \par The persistent right bundle branch block pattern on the patient's electrocardiogram is considered a non-diagnostic finding in this patient, possibly representing an early manifestation of conduction system disease. Note that echocardiography did not demonstrate significant structural heart disease, and previous nuclear stress testing did not demonstrate evidence for underlying ischemic heart disease.\par \par I have reviewed the findings of the blood test report on 12/11/18 in detail with the patient today, and I have instructed her to continue Zocor at a dosage of 40 mg daily for the time being. She is going to be having a follow-up fasting lipid profile and chemistry screen performed in the near future for reassessment.\par \par The importance of proper dietary habits, weight loss, and regular exercise was again emphasized to the patient today.\par \par   I have asked the patient to call me if she should have any questions or problems pertaining to these matters, and especially if she should experience any concerning symptoms. I have otherwise asked her to return to the office for follow-up cardiac evaluation and blood pressure reassessment in 6 months, provided she remains clinically stable in the interim.

## 2019-06-19 NOTE — PHYSICAL EXAM
[General Appearance - In No Acute Distress] : no acute distress [No Oral Pallor] : no oral pallor [Normal Conjunctiva] : the conjunctiva exhibited no abnormalities [No Jugular Venous Womack A Waves] : no jugular venous womack A waves [Normal Jugular Venous V Waves Present] : normal jugular venous V waves present [Normal Jugular Venous A Waves Present] : normal jugular venous A waves present [Respiration, Rhythm And Depth] : normal respiratory rhythm and effort [Auscultation Breath Sounds / Voice Sounds] : lungs were clear to auscultation bilaterally [Abdomen Tenderness] : non-tender [Bowel Sounds] : normal bowel sounds [Abnormal Walk] : normal gait [Cyanosis, Localized] : no localized cyanosis [] : no rash [Not Palpable] : not palpable [No Precordial Heave] : no precordial heave was noted [Oriented To Time, Place, And Person] : oriented to person, place, and time [Normal Rate] : normal [Rhythm Regular] : regular [Normal S1] : normal S1 [Normal S2] : normal S2 [No Gallop] : no gallop heard [No Murmur] : no murmurs heard [2+] : left 2+ [No Abnormalities] : the abdominal aorta was not enlarged and no bruit was heard [No Pitting Edema] : no pitting edema present [FreeTextEntry1] : mildly overweight white female [Apical Thrill] : no thrill palpable at the apex [Click] : no click [Pericardial Rub] : no pericardial rub [Left Carotid Bruit] : no bruit heard over the left carotid [Right Carotid Bruit] : no bruit heard over the right carotid

## 2019-06-21 LAB
25(OH)D3 SERPL-MCNC: 58.8 NG/ML
ALBUMIN SERPL ELPH-MCNC: 4.6 G/DL
ALP BLD-CCNC: 76 U/L
ALT SERPL-CCNC: 22 U/L
ANION GAP SERPL CALC-SCNC: 11 MMOL/L
AST SERPL-CCNC: 18 U/L
BILIRUB SERPL-MCNC: 0.4 MG/DL
BUN SERPL-MCNC: 21 MG/DL
CALCIUM SERPL-MCNC: 10.3 MG/DL
CHLORIDE SERPL-SCNC: 102 MMOL/L
CHOLEST SERPL-MCNC: 196 MG/DL
CHOLEST/HDLC SERPL: 3 RATIO
CO2 SERPL-SCNC: 29 MMOL/L
CREAT SERPL-MCNC: 0.73 MG/DL
GLUCOSE SERPL-MCNC: 94 MG/DL
HDLC SERPL-MCNC: 65 MG/DL
LDLC SERPL CALC-MCNC: 110 MG/DL
POTASSIUM SERPL-SCNC: 5.2 MMOL/L
PROT SERPL-MCNC: 7.3 G/DL
SODIUM SERPL-SCNC: 142 MMOL/L
TRIGL SERPL-MCNC: 106 MG/DL

## 2019-10-15 ENCOUNTER — APPOINTMENT (OUTPATIENT)
Dept: CARDIOLOGY | Facility: CLINIC | Age: 69
End: 2019-10-15
Payer: MEDICARE

## 2019-10-15 ENCOUNTER — NON-APPOINTMENT (OUTPATIENT)
Age: 69
End: 2019-10-15

## 2019-10-15 VITALS
DIASTOLIC BLOOD PRESSURE: 84 MMHG | SYSTOLIC BLOOD PRESSURE: 133 MMHG | WEIGHT: 170 LBS | HEIGHT: 65 IN | OXYGEN SATURATION: 100 % | HEART RATE: 59 BPM | BODY MASS INDEX: 28.32 KG/M2

## 2019-10-15 PROCEDURE — 93000 ELECTROCARDIOGRAM COMPLETE: CPT

## 2019-10-15 PROCEDURE — 99215 OFFICE O/P EST HI 40 MIN: CPT

## 2019-10-15 NOTE — DISCUSSION/SUMMARY
[FreeTextEntry1] : Mrs. Lowry has recently noted symptoms of fatigue, dyspnea on exertion, and palpitations. I suspect that her symptoms may be stress or anxiety-related, especially her palpitations, noting bad she stated that she was experiencing palpitations while I was examining her, and at the time, her heart rhythm and rate were normal. Her cardiac examination today is unremarkable, and unchanged from her previous visit with me. Her blood pressure reading today is normal. Her electrocardiogram today reveals sinus rhythm at a rate of 65 beats per minute with a right bundle branch block pattern and non-diagnostic repolarization abnormalities, essentially unchanged from her previous office tracing.\par \par As her blood pressure reading today is normal, I have instructed the patient to continue Benicar 20 mg daily and Toprol-XL 50 mg daily for the time being.\par \par The persistent right bundle branch block pattern on the patient's electrocardiogram is considered a non-diagnostic finding in this patient, possibly representing an early manifestation of conduction system disease. Note that echocardiography did not demonstrate significant structural heart disease, and previous nuclear stress testing did not demonstrate evidence for underlying ischemic heart disease.\par \par In view of her recent symptoms of fatigue and dyspnea on exertion, I am referring the patient for nuclear stress testing to evaluate for the presence of exercise-induced myocardial ischemia and/or arrhythmias. In addition, echocardiography will be performed to evaluate left ventricular function and valvular function. The patient is going to make arrangements to have these studies performed through our office, and I will telephone her to discuss the findings, once the studies have been completed.\par \par I have reviewed the findings of the blood test report on 10/1/19 in detail with the patient today, and I have instructed her to continue Zocor at a dosage of 40 mg daily for the time being.\par \par The importance of proper dietary habits, weight loss, and regular exercise was again emphasized to the patient today.\par \par   I have asked the patient to call me if she should have any questions or problems pertaining to these matters, and especially if she should experience any concerning symptoms. Further recommendations regarding cardiac evaluation and/or treatment will be considered, pending the patient's clinical course, as well as the findings on the upcoming cardiac studies.

## 2019-10-15 NOTE — PHYSICAL EXAM
[General Appearance - In No Acute Distress] : no acute distress [Normal Conjunctiva] : the conjunctiva exhibited no abnormalities [No Oral Pallor] : no oral pallor [Normal Jugular Venous V Waves Present] : normal jugular venous V waves present [Normal Jugular Venous A Waves Present] : normal jugular venous A waves present [No Jugular Venous Womack A Waves] : no jugular venous womack A waves [Respiration, Rhythm And Depth] : normal respiratory rhythm and effort [Auscultation Breath Sounds / Voice Sounds] : lungs were clear to auscultation bilaterally [Bowel Sounds] : normal bowel sounds [Abnormal Walk] : normal gait [Cyanosis, Localized] : no localized cyanosis [Abdomen Tenderness] : non-tender [Not Palpable] : not palpable [Oriented To Time, Place, And Person] : oriented to person, place, and time [] : no rash [No Precordial Heave] : no precordial heave was noted [Normal Rate] : normal [Normal S1] : normal S1 [Normal S2] : normal S2 [Rhythm Regular] : regular [No Gallop] : no gallop heard [No Murmur] : no murmurs heard [2+] : right 2+ [No Abnormalities] : the abdominal aorta was not enlarged and no bruit was heard [No Pitting Edema] : no pitting edema present [FreeTextEntry1] : mildly overweight white female [Apical Thrill] : no thrill palpable at the apex [Click] : no click [Right Carotid Bruit] : no bruit heard over the right carotid [Pericardial Rub] : no pericardial rub [Left Carotid Bruit] : no bruit heard over the left carotid

## 2019-10-15 NOTE — REASON FOR VISIT
[Hyperlipidemia] : hyperlipidemia [Hypertension] : hypertension [Palpitations] : palpitations [Dyspnea] : dyspnea [FreeTextEntry1] : ventricular ectopy

## 2019-10-15 NOTE — HISTORY OF PRESENT ILLNESS
[FreeTextEntry1] : Mrs. Flakita Lowry presented to the office today for follow-up cardiac evaluation. I last evaluated the patient in the office on 6/19/19.\par \par The patient is a 69-year-old female with a history of labile hypertension, a dyslipidemia, a right bundle branch block pattern on her electrocardiogram (discovered on a routine tracing performed at her internist's office on 5/26/11), mild carotid atherosclerosis, ventricular premature contractions, palpitations (suppressed with beta-blocker therapy), atypical chest discomfort (attributed to GERD), GERD, a hiatal hernia, nephrolithiasis, osteoarthritis, insomnia, and vitamin D deficiency.\par \par The patient underwent a left ureteral stenting procedure a few weeks ago for treatment of nephrolithiasis. Following the procedure, she noted loss of appetite, fatigue, and dyspnea on exertion. The stent was removed on 10/10/19, and the patient recently noted improvement in her symptoms, however, she is still noted a degree of fatigue and dyspnea on exertion. She does not describe having experienced any symptoms of chest discomfort in association with her activities. She has not noted orthopnea, paroxysmal nocturnal dyspnea, or lower extremity edema. Over the past few weeks, she has been aware of intermittently experiencing palpitations, described as a sensation of tachycardia, particularly when lying quietly in a supine position. She has not experienced any sustained episodes of palpitations. She has not experienced any episodes of presyncope or syncope.\par \par Laboratory studies performed on 10/1/19 revealed cholesterol 143, triglycerides 147, HDL 46, and calculated LDL 68. The liver chemistries were normal. The glucose level was 103. The BUN and creatinine were 18 and 0.82, respectively. The potassium level was 5.3. The hemoglobin and hematocrit were 13.5 and 40.1, respectively.\par \par A Holter monitor study performed from 5/31/18 through 6/1/18 revealed the predominant rhythm throughout the recording to be sinus rhythm at an average rate of 76 beats per minute, with a range of  beats per minute. Rare supraventricular premature contractions were exhibited, including 4 couplets. 2 brief episodes of non-sustained supraventricular tachycardia were exhibited, the longest of which was 4 beats in duration. Rare ventricular premature contractions were exhibited. No episodes of ventricular tachycardia were exhibited. No significant pauses were exhibited.\par \par Echocardiography performed on 4/10/13 revealed normal cardiac structural integrity. There was normal left ventricular systolic function, with an estimated ejection fraction of 77%. There was evidence for mildly impaired diastolic relaxation the left ventricle. Mild tricuspid regurgitation was demonstrated, without significant elevation in the pulmonary artery systolic pressure.\par \par  Carotid artery Doppler testing performed in our office on 5/29/15 revealed mild plaque formation involving the right bulbar region.\par \par A  nuclear stress study performed on 5/14/09, for further evaluation of atypical chest discomfort at that time, revealed the patient to exhibit normal exercise tolerance, without the inducement of cardiac symptoms. The electrocardiographic portion of the study was considered non-diagnostic. The cardiac imaging portion of the study revealed normal left ventricular myocardial perfusion and systolic function, with a calculated ejection fraction of 80%.\par \par Previous History:\par \par At the time of a previous visit here on 12/11/18, I instructed the patient to increase her dosage of Toprol-XL from 25 mg daily to 50 mg daily, in an effort to more optimally control her blood pressure, as well as to potentially suppress her palpitations. She has been tolerating the increased dosage of the Toprol-XL, and has noted her palpitations to have significantly decreased in frequency and duration.  At the time of a follow-up visit here on 1/8/19, I added Benicar 20 mg daily to the patient's medication regimen, in an effort to control her blood pressure. The dosage of Benicar was increased to 40 mg daily at the time of a follow-up visit here on 1/30/19, noting that the patient was exhibiting a moderately elevated blood pressure reading at that time. The patient tripped on her front stoop on 3/14/19, lacerating her knee. She required suturing, and when she presented for suturing, she was noted to have a blood pressure reading of 112/43. She telephoned our office, and spoke with my associate, Dr. Smith, who instructed her to reduce the dosage of Benicar to 20 mg daily at that point.\par \par At the time of a previous visit here on 12/28/15, the patient reported that over the preceding few months, she had been experiencing recurrence of randomly-occurring palpitations, described as momentary "loud bumps". Subsequent cardiac rhythm event monitoring performed from 12/29/15 through 1/4/16 revealed palpitations on 12/29/15 to have correlated with isolated ventricular premature contractions. Note that I had instructed the patient to increase her dosage of Toprol-XL from 25 mg daily to 50 mg daily at the time of a previous visit here on 12/28/15, in an effort to suppress her palpitations, as well as to more optimally control her blood pressure. I spoke with her subsequently on the telephone on 1/19/17, at which time she informed me that she had become fatigued on the higher doses of Toprol-XL, and had reduced the dosage back down to 25 mg daily, with resolution of the fatigue. I instructed her to remain on Toprol-XL 25 mg daily at that point.\par \par When I had evaluated the patient in the office on 4/22/13, she described experiencing persistent palpitations at that time, described as a sensation of a "hard beat". Her blood pressure was noted to be persistently elevated at that time as well. I prescribed Toprol-XL 25 mg daily at bedtime, in an effort to more optimally control the patient's blood pressure, as well as to suppress her palpitations. Note that I suspected that the patient's palpitations were secondary to ventricular ectopy, as a ventricular premature contraction was noted on her electrocardiogram at the time of a previous visit here on 4/1/13.\par \par   As far as risk factors for coronary artery disease are concerned, the patient has a history of a type IIa dyslipidemia, being treated with statin therapy. She has exhibited labile hypertensive readings on occasion, however, states that she does not typically exhibit hypertension. She does not have a history of diabetes. She denies any history of cigarette smoking. She does not have a known definite immediate family history of premature coronary artery disease.\par \par   Past medical history, other than previously stated, is significant for vitamin D deficiency, left knee arthroscopic surgery for treatment of a torn meniscus in September of 2011, osteoarthritis of the left knee, a left Baker's cyst, a LEATHA/BSO procedure in the 1990's for treatment of uterine fibroids, resection of a benign left breast tumor in 1988, and a tonsillectomy at age 2. The patient has had 3 full-term uncomplicated pregnancies, all delivered vaginally.

## 2019-10-28 ENCOUNTER — APPOINTMENT (OUTPATIENT)
Dept: CARDIOLOGY | Facility: CLINIC | Age: 69
End: 2019-10-28
Payer: MEDICARE

## 2019-10-28 PROCEDURE — 93306 TTE W/DOPPLER COMPLETE: CPT

## 2019-11-15 ENCOUNTER — APPOINTMENT (OUTPATIENT)
Dept: CARDIOLOGY | Facility: CLINIC | Age: 69
End: 2019-11-15

## 2020-05-08 ENCOUNTER — RX RENEWAL (OUTPATIENT)
Age: 70
End: 2020-05-08

## 2020-10-14 ENCOUNTER — APPOINTMENT (OUTPATIENT)
Dept: CARDIOLOGY | Facility: CLINIC | Age: 70
End: 2020-10-14
Payer: MEDICARE

## 2020-10-14 ENCOUNTER — NON-APPOINTMENT (OUTPATIENT)
Age: 70
End: 2020-10-14

## 2020-10-14 VITALS
DIASTOLIC BLOOD PRESSURE: 65 MMHG | HEIGHT: 65 IN | HEART RATE: 63 BPM | BODY MASS INDEX: 28.32 KG/M2 | WEIGHT: 170 LBS | OXYGEN SATURATION: 96 % | SYSTOLIC BLOOD PRESSURE: 115 MMHG

## 2020-10-14 DIAGNOSIS — R06.00 DYSPNEA, UNSPECIFIED: ICD-10-CM

## 2020-10-14 DIAGNOSIS — I07.1 RHEUMATIC TRICUSPID INSUFFICIENCY: ICD-10-CM

## 2020-10-14 PROCEDURE — 99215 OFFICE O/P EST HI 40 MIN: CPT

## 2020-10-14 PROCEDURE — 93000 ELECTROCARDIOGRAM COMPLETE: CPT

## 2020-10-14 RX ORDER — OMEPRAZOLE 20 MG/1
20 CAPSULE, DELAYED RELEASE ORAL
Refills: 3 | Status: ACTIVE | COMMUNITY

## 2020-10-14 RX ORDER — METAXALONE 800 MG/1
800 TABLET ORAL
Qty: 90 | Refills: 3 | Status: DISCONTINUED | COMMUNITY
Start: 2019-01-29 | End: 2020-10-14

## 2020-10-14 RX ORDER — CIMETIDINE 400 MG
400 TABLET ORAL TWICE DAILY
Refills: 0 | Status: DISCONTINUED | COMMUNITY
End: 2020-10-14

## 2020-11-03 ENCOUNTER — RX RENEWAL (OUTPATIENT)
Age: 70
End: 2020-11-03

## 2020-12-08 ENCOUNTER — APPOINTMENT (OUTPATIENT)
Dept: CARDIOLOGY | Facility: CLINIC | Age: 70
End: 2020-12-08

## 2020-12-10 ENCOUNTER — RX RENEWAL (OUTPATIENT)
Age: 70
End: 2020-12-10

## 2021-01-21 ENCOUNTER — APPOINTMENT (OUTPATIENT)
Dept: DISASTER EMERGENCY | Facility: CLINIC | Age: 71
End: 2021-01-21

## 2021-02-04 ENCOUNTER — APPOINTMENT (OUTPATIENT)
Dept: DISASTER EMERGENCY | Facility: CLINIC | Age: 71
End: 2021-02-04

## 2021-03-04 ENCOUNTER — APPOINTMENT (OUTPATIENT)
Dept: CARDIOLOGY | Facility: CLINIC | Age: 71
End: 2021-03-04
Payer: MEDICARE

## 2021-03-04 PROCEDURE — 93306 TTE W/DOPPLER COMPLETE: CPT

## 2021-04-27 ENCOUNTER — RX RENEWAL (OUTPATIENT)
Age: 71
End: 2021-04-27

## 2021-06-03 ENCOUNTER — APPOINTMENT (OUTPATIENT)
Dept: DISASTER EMERGENCY | Facility: CLINIC | Age: 71
End: 2021-06-03

## 2021-06-17 ENCOUNTER — APPOINTMENT (OUTPATIENT)
Dept: DISASTER EMERGENCY | Facility: CLINIC | Age: 71
End: 2021-06-17

## 2021-11-08 ENCOUNTER — APPOINTMENT (OUTPATIENT)
Dept: CARDIOLOGY | Facility: CLINIC | Age: 71
End: 2021-11-08
Payer: MEDICARE

## 2021-11-08 VITALS
OXYGEN SATURATION: 96 % | BODY MASS INDEX: 28.16 KG/M2 | HEART RATE: 82 BPM | SYSTOLIC BLOOD PRESSURE: 137 MMHG | WEIGHT: 169 LBS | HEIGHT: 65 IN | DIASTOLIC BLOOD PRESSURE: 73 MMHG

## 2021-11-08 LAB
ANION GAP SERPL CALC-SCNC: 13 MMOL/L
BUN SERPL-MCNC: 23 MG/DL
CALCIUM SERPL-MCNC: 10.4 MG/DL
CHLORIDE SERPL-SCNC: 99 MMOL/L
CO2 SERPL-SCNC: 28 MMOL/L
CREAT SERPL-MCNC: 0.95 MG/DL
GLUCOSE SERPL-MCNC: 100 MG/DL
POTASSIUM SERPL-SCNC: 4.8 MMOL/L
SODIUM SERPL-SCNC: 141 MMOL/L

## 2021-11-08 PROCEDURE — 99215 OFFICE O/P EST HI 40 MIN: CPT

## 2021-11-08 PROCEDURE — 93000 ELECTROCARDIOGRAM COMPLETE: CPT

## 2021-11-14 ENCOUNTER — OUTPATIENT (OUTPATIENT)
Dept: OUTPATIENT SERVICES | Facility: HOSPITAL | Age: 71
LOS: 1 days | End: 2021-11-14
Payer: MEDICARE

## 2021-11-14 DIAGNOSIS — Z11.52 ENCOUNTER FOR SCREENING FOR COVID-19: ICD-10-CM

## 2021-11-14 LAB — SARS-COV-2 RNA SPEC QL NAA+PROBE: SIGNIFICANT CHANGE UP

## 2021-11-14 PROCEDURE — U0003: CPT

## 2021-11-14 PROCEDURE — C9803: CPT

## 2021-11-14 PROCEDURE — U0005: CPT

## 2021-11-16 ENCOUNTER — OUTPATIENT (OUTPATIENT)
Dept: OUTPATIENT SERVICES | Facility: HOSPITAL | Age: 71
LOS: 1 days | End: 2021-11-16
Payer: MEDICARE

## 2021-11-16 VITALS
SYSTOLIC BLOOD PRESSURE: 132 MMHG | OXYGEN SATURATION: 98 % | HEART RATE: 72 BPM | RESPIRATION RATE: 18 BRPM | DIASTOLIC BLOOD PRESSURE: 58 MMHG

## 2021-11-16 VITALS
WEIGHT: 169.98 LBS | TEMPERATURE: 100 F | RESPIRATION RATE: 18 BRPM | HEART RATE: 68 BPM | DIASTOLIC BLOOD PRESSURE: 68 MMHG | SYSTOLIC BLOOD PRESSURE: 146 MMHG | OXYGEN SATURATION: 98 % | HEIGHT: 64 IN

## 2021-11-16 DIAGNOSIS — R06.00 DYSPNEA, UNSPECIFIED: ICD-10-CM

## 2021-11-16 DIAGNOSIS — Z90.710 ACQUIRED ABSENCE OF BOTH CERVIX AND UTERUS: Chronic | ICD-10-CM

## 2021-11-16 LAB
ALBUMIN SERPL ELPH-MCNC: 4.2 G/DL — SIGNIFICANT CHANGE UP (ref 3.3–5)
ALP SERPL-CCNC: 87 U/L — SIGNIFICANT CHANGE UP (ref 40–120)
ALT FLD-CCNC: 24 U/L — SIGNIFICANT CHANGE UP (ref 10–45)
ANION GAP SERPL CALC-SCNC: 10 MMOL/L — SIGNIFICANT CHANGE UP (ref 5–17)
AST SERPL-CCNC: 26 U/L — SIGNIFICANT CHANGE UP (ref 10–40)
BILIRUB SERPL-MCNC: 0.5 MG/DL — SIGNIFICANT CHANGE UP (ref 0.2–1.2)
BUN SERPL-MCNC: 28 MG/DL — HIGH (ref 7–23)
CALCIUM SERPL-MCNC: 9.9 MG/DL — SIGNIFICANT CHANGE UP (ref 8.4–10.5)
CHLORIDE SERPL-SCNC: 106 MMOL/L — SIGNIFICANT CHANGE UP (ref 96–108)
CO2 SERPL-SCNC: 27 MMOL/L — SIGNIFICANT CHANGE UP (ref 22–31)
CREAT SERPL-MCNC: 0.69 MG/DL — SIGNIFICANT CHANGE UP (ref 0.5–1.3)
GLUCOSE SERPL-MCNC: 97 MG/DL — SIGNIFICANT CHANGE UP (ref 70–99)
HCT VFR BLD CALC: 43.5 % — SIGNIFICANT CHANGE UP (ref 34.5–45)
HGB BLD-MCNC: 14.5 G/DL — SIGNIFICANT CHANGE UP (ref 11.5–15.5)
MCHC RBC-ENTMCNC: 30.2 PG — SIGNIFICANT CHANGE UP (ref 27–34)
MCHC RBC-ENTMCNC: 33.3 GM/DL — SIGNIFICANT CHANGE UP (ref 32–36)
MCV RBC AUTO: 90.6 FL — SIGNIFICANT CHANGE UP (ref 80–100)
NRBC # BLD: 0 /100 WBCS — SIGNIFICANT CHANGE UP (ref 0–0)
PLATELET # BLD AUTO: 234 K/UL — SIGNIFICANT CHANGE UP (ref 150–400)
POTASSIUM SERPL-MCNC: 4.5 MMOL/L — SIGNIFICANT CHANGE UP (ref 3.5–5.3)
POTASSIUM SERPL-SCNC: 4.5 MMOL/L — SIGNIFICANT CHANGE UP (ref 3.5–5.3)
PROT SERPL-MCNC: 7.1 G/DL — SIGNIFICANT CHANGE UP (ref 6–8.3)
RBC # BLD: 4.8 M/UL — SIGNIFICANT CHANGE UP (ref 3.8–5.2)
RBC # FLD: 12.3 % — SIGNIFICANT CHANGE UP (ref 10.3–14.5)
SODIUM SERPL-SCNC: 143 MMOL/L — SIGNIFICANT CHANGE UP (ref 135–145)
WBC # BLD: 7.44 K/UL — SIGNIFICANT CHANGE UP (ref 3.8–10.5)
WBC # FLD AUTO: 7.44 K/UL — SIGNIFICANT CHANGE UP (ref 3.8–10.5)

## 2021-11-16 PROCEDURE — C1769: CPT

## 2021-11-16 PROCEDURE — 93460 R&L HRT ART/VENTRICLE ANGIO: CPT | Mod: 26

## 2021-11-16 PROCEDURE — 93460 R&L HRT ART/VENTRICLE ANGIO: CPT

## 2021-11-16 PROCEDURE — 80053 COMPREHEN METABOLIC PANEL: CPT

## 2021-11-16 PROCEDURE — C1887: CPT

## 2021-11-16 PROCEDURE — 99152 MOD SED SAME PHYS/QHP 5/>YRS: CPT

## 2021-11-16 PROCEDURE — 93010 ELECTROCARDIOGRAM REPORT: CPT

## 2021-11-16 PROCEDURE — 93005 ELECTROCARDIOGRAM TRACING: CPT

## 2021-11-16 PROCEDURE — 85027 COMPLETE CBC AUTOMATED: CPT

## 2021-11-16 PROCEDURE — 99236 HOSP IP/OBS SAME DATE HI 85: CPT

## 2021-11-16 PROCEDURE — C1889: CPT

## 2021-11-16 PROCEDURE — C1894: CPT

## 2021-11-16 RX ORDER — FUROSEMIDE 40 MG
1 TABLET ORAL
Qty: 0 | Refills: 0 | DISCHARGE

## 2021-11-16 NOTE — H&P CARDIOLOGY - NSICDXPASTMEDICALHX_GEN_ALL_CORE_FT
PAST MEDICAL HISTORY:  GERD (gastroesophageal reflux disease)     H/O valvular heart disease     Hiatal hernia     HLD (hyperlipidemia)     HTN (hypertension)     Pulmonary HTN

## 2021-11-16 NOTE — CHART NOTE - NSCHARTNOTEFT_GEN_A_CORE
called to evaluate femoral access site by RN  on palpation patient has discomfort above access site area slightly firm  Manually compressed hematoma now softer and less tender  VSS  Pt bedrest time adjusted   d/w Dr. Zavala

## 2021-11-16 NOTE — ASU DISCHARGE PLAN (ADULT/PEDIATRIC) - CARE PROVIDER_API CALL
Pedro Landin (MD)  Cardiovascular Disease; Internal Medicine  [address] [place] [address]  Phone: [phone]  Fax: [phone]  Established Patient  Follow Up Time: 2 weeks

## 2021-11-16 NOTE — ASU DISCHARGE PLAN (ADULT/PEDIATRIC) - ASU DC SPECIAL INSTRUCTIONSFT
Wound Care:   the day AFTER your procedure remove bandage GENTLY, and clean using  mild soap and gentle warm, water stream, pat dry. leave OPEN to air. YOU MAY SHOWER   DO NOT apply lotions, creams, ointments, powder, parfumes to your incision site  DO NOT SOAK your site for 1 week ( no baths, no pools, no tubs, etc...)  Check  your groin and /or wrist daily. A small amount of bruising, and soarness are normal  ACTIVITY: for 24 hours   - DO NOT DRIVE  - DO NOT make any important decisions or sign legal documents   - DO NOT operate heavy machineries   - you may resume sexual activity in 48 hours, unless otherwise instructed by your cardiologist   If your procedure was done through the WRIST: for the NEXT 3DAYS:  - avoid pushing, pulling, with that affected wrist   - avoid repeated movement of that hand and wrist ( eg: typing, hammering)  - DO NOT LIFT anything more than 5 lbs   If your procedure was done through the GROIN: for the NEXT 5 DAYS  - Limit climbing stairs, DO NOT soak in bathtub or pool  - no strenuous activities, pushing, pulling, straining  - Do not lift anything 10lbs or heavier   MEDICATION:   take your medications as explained ( see discharge paperwork)   If you received a STENT, you will be taking antiplatelet medications to KEEP YOUR STENT OPEN ( eg: Aspirin, Plavix, Brilinta, Effient, etc).  Take as prescribed DO NOT STOP taking them without consulting with your cardiologist first.   Follow heart healthy diet recommended by your doctor, , if you smoke STOP SMOKING ( may call [phone] for center of tobacco control if you need assistance)   CALL your doctor to make appointment in 2 WEEKS   ***CALL YOUR DOCTOR***  if you experience: fever, chills, body aches, or severe pain, swelling, redness, heat or yellow discharge at incision site  If you experience Bleeding or excruciating pain at the procedural site, swelling ( golf ball size) at your procedural site  If you experience CHEST PAIN  If you experience extremity numbness, tingling, temperature change ( of your procedural site)   If you are unable to reach your doctor, you may contact:   -Cardiology Office at [facility] at [phone] or   - [facility] [phone]  - [facility] [phone]

## 2021-11-16 NOTE — H&P CARDIOLOGY - HISTORY OF PRESENT ILLNESS
This is a 70 yo female PMH GERD, hiatal hernia, nephrolithiasis, osteoarthritis, labile HTN, HLD, RBBB, mild AI, mild TR, moderate PHTN, PVCs, palpitations with recently noted dyspnea on exertion and weight gain. Her evaluation in the emergency room at [facility] on 11/5/2021 was remarkable for an elevated PBNP level at 952, however, a chest x-ray revealed no active disease and an echocardiogram revealed normal left ventricular systolic function. She was treated with Lasix, and over the past few days, has lost the 9 pounds that she had gained over the preceding 2 weeks. She has continued to note a similar degree of dyspnea on exertion.  Pt. denies dizziness, diaphoresis, nausea, vomiting, peripheral edema, or syncope.  No implanted monitoring devices. Pt. presents for further evaluation and RHC/LHC.                     This is a 70 yo female PMH GERD, hiatal hernia, nephrolithiasis, osteoarthritis, labile HTN, HLD, RBBB, mild AI, mild TR, moderate PHTN, PVCs, palpitations with recently noted dyspnea on exertion and weight gain. Her evaluation in the emergency room at [facility] on 11/5/2021 was remarkable for an elevated PBNP level at 952, however, a chest x-ray revealed no active disease and an echocardiogram revealed normal left ventricular systolic function. She was treated with Lasix, and over the past few days, has lost the 9 pounds that she had gained over the preceding 2 weeks. She has continued to note a similar degree of dyspnea on exertion.  Pt. denies dizziness, diaphoresis, nausea, vomiting, peripheral edema, or syncope.  No implanted monitoring devices. TTE 3/4/21 EF 65. mild to mod TR, mild AR RV systolic pressure 52. Pt. presents for further evaluation and RHC/LHC.                     This is a 72 yo female PMH GERD, hiatal hernia, nephrolithiasis, osteoarthritis, labile HTN, HLD, RBBB, mild AI, mild TR, moderate PHTN, PVCs, palpitations with recently noted dyspnea on exertion and weight gain. Her evaluation in the emergency room at [facility] on 11/5/2021 was remarkable for an elevated PBNP level at 952, however, a chest x-ray revealed no active disease and an echocardiogram revealed normal left ventricular systolic function. She was treated with Lasix, and over the past few days, has lost the 9 pounds that she had gained over the preceding 2 weeks. She has continued to note a similar degree of dyspnea on exertion.  Pt. denies dizziness, diaphoresis, nausea, vomiting, peripheral edema, or syncope.  No implanted monitoring devices. TTE 3/4/21 EF 65. mild to mod TR, mild AR RV systolic pressure 52. Pt. referred for further evaluation and RHC/LHC by DR. Ladnin.

## 2021-11-20 RX ORDER — FUROSEMIDE 40 MG/1
40 TABLET ORAL DAILY
Refills: 0 | Status: DISCONTINUED | COMMUNITY
End: 2021-11-20

## 2021-12-07 ENCOUNTER — APPOINTMENT (OUTPATIENT)
Dept: CARDIOLOGY | Facility: CLINIC | Age: 71
End: 2021-12-07
Payer: MEDICARE

## 2021-12-07 ENCOUNTER — NON-APPOINTMENT (OUTPATIENT)
Age: 71
End: 2021-12-07

## 2021-12-07 VITALS — HEART RATE: 66 BPM | SYSTOLIC BLOOD PRESSURE: 145 MMHG | DIASTOLIC BLOOD PRESSURE: 72 MMHG | OXYGEN SATURATION: 99 %

## 2021-12-07 DIAGNOSIS — R06.00 DYSPNEA, UNSPECIFIED: ICD-10-CM

## 2021-12-07 DIAGNOSIS — R79.89 OTHER SPECIFIED ABNORMAL FINDINGS OF BLOOD CHEMISTRY: ICD-10-CM

## 2021-12-07 DIAGNOSIS — Z87.898 PERSONAL HISTORY OF OTHER SPECIFIED CONDITIONS: ICD-10-CM

## 2021-12-07 PROCEDURE — 99215 OFFICE O/P EST HI 40 MIN: CPT

## 2021-12-07 PROCEDURE — 93000 ELECTROCARDIOGRAM COMPLETE: CPT

## 2022-01-18 ENCOUNTER — RX RENEWAL (OUTPATIENT)
Age: 72
End: 2022-01-18

## 2022-03-27 PROBLEM — I27.20 PULMONARY HYPERTENSION, UNSPECIFIED: Chronic | Status: ACTIVE | Noted: 2021-11-16

## 2022-03-27 PROBLEM — K21.9 GASTRO-ESOPHAGEAL REFLUX DISEASE WITHOUT ESOPHAGITIS: Chronic | Status: ACTIVE | Noted: 2021-11-16

## 2022-03-27 PROBLEM — I10 ESSENTIAL (PRIMARY) HYPERTENSION: Chronic | Status: ACTIVE | Noted: 2021-11-16

## 2022-03-27 PROBLEM — E78.5 HYPERLIPIDEMIA, UNSPECIFIED: Chronic | Status: ACTIVE | Noted: 2021-11-16

## 2022-03-27 PROBLEM — Z86.79 PERSONAL HISTORY OF OTHER DISEASES OF THE CIRCULATORY SYSTEM: Chronic | Status: ACTIVE | Noted: 2021-11-16

## 2022-03-27 PROBLEM — K44.9 DIAPHRAGMATIC HERNIA WITHOUT OBSTRUCTION OR GANGRENE: Chronic | Status: ACTIVE | Noted: 2021-11-16

## 2022-04-05 ENCOUNTER — APPOINTMENT (OUTPATIENT)
Dept: ORTHOPEDIC SURGERY | Facility: CLINIC | Age: 72
End: 2022-04-05
Payer: MEDICARE

## 2022-04-05 DIAGNOSIS — M43.17 SPONDYLOLISTHESIS, LUMBOSACRAL REGION: ICD-10-CM

## 2022-04-05 PROCEDURE — 72100 X-RAY EXAM L-S SPINE 2/3 VWS: CPT

## 2022-04-05 PROCEDURE — 20610 DRAIN/INJ JOINT/BURSA W/O US: CPT | Mod: LT

## 2022-04-05 PROCEDURE — 72170 X-RAY EXAM OF PELVIS: CPT

## 2022-04-05 PROCEDURE — 73562 X-RAY EXAM OF KNEE 3: CPT | Mod: LT

## 2022-04-05 PROCEDURE — 99214 OFFICE O/P EST MOD 30 MIN: CPT | Mod: 25

## 2022-04-05 RX ORDER — METHYLPREDNISOLONE 4 MG/1
4 TABLET ORAL
Qty: 1 | Refills: 0 | Status: ACTIVE | COMMUNITY
Start: 2022-04-05 | End: 1900-01-01

## 2022-04-05 NOTE — HISTORY OF PRESENT ILLNESS
[Gradual] : gradual [de-identified] : 4-5-22 Known b/l knee oa. did well with orthovisc injections in 2020. Over the last few months pain has returned worse on the left knee but on the right as well she has been ambulating with a limp\par \par we have also seen her for her lower back pain which has become more problematic. she has right sided symptoms with radicular complaints down the right leg into the foot worse with prolonged standing and walking.  [FreeTextEntry1] : b/l knees and lower back [FreeTextEntry5] : back pain has gotten worse

## 2022-04-05 NOTE — PHYSICAL EXAM
[5___] : hamstring 5[unfilled]/5 [Equivocal] : equivocal Althea [] : mildly antalgic [Disc space narrowing] : Disc space narrowing [Spondylolithesis] : Spondylolithesis [Bilateral] : knee bilaterally [AP] : anteroposterior [Lateral] : lateral [[place]] : skyline [Degenerative change] : Degenerative change [de-identified] : she notes tingling/radicular pain down the right leg anterior to the shin [FreeTextEntry1] : 5-1 narrowing and stenosis, 4-5 spondylolisthesis [de-identified] : negative [de-identified] : the left knee is more symptomatic than the right [TWNoteComboBox7] : flexion 120 degrees [de-identified] : extension 3 degrees [FreeTextEntry9] : right knee mild , left knee severe medial narrowing

## 2022-04-05 NOTE — DISCUSSION/SUMMARY
[de-identified] : will administer csi today to her left knee as it is more problematic. see her next week to start orthovisc series of 4 both knees\par \par due to continued radicular pain lumbar down the right leg that has failed conservative measures will get mri f/u and will likely refer to pain mgt for lesi

## 2022-04-06 ENCOUNTER — APPOINTMENT (OUTPATIENT)
Dept: MRI IMAGING | Facility: CLINIC | Age: 72
End: 2022-04-06
Payer: MEDICARE

## 2022-04-06 PROCEDURE — 72148 MRI LUMBAR SPINE W/O DYE: CPT | Mod: MH

## 2022-04-12 ENCOUNTER — APPOINTMENT (OUTPATIENT)
Dept: ORTHOPEDIC SURGERY | Facility: CLINIC | Age: 72
End: 2022-04-12
Payer: MEDICARE

## 2022-04-12 VITALS — BODY MASS INDEX: 26.66 KG/M2 | HEIGHT: 65 IN | WEIGHT: 160 LBS

## 2022-04-12 DIAGNOSIS — M48.061 SPINAL STENOSIS, LUMBAR REGION WITHOUT NEUROGENIC CLAUDICATION: ICD-10-CM

## 2022-04-12 DIAGNOSIS — S20.212A CONTUSION OF LEFT FRONT WALL OF THORAX, INITIAL ENCOUNTER: ICD-10-CM

## 2022-04-12 PROCEDURE — 99213 OFFICE O/P EST LOW 20 MIN: CPT | Mod: 25

## 2022-04-12 PROCEDURE — 99214 OFFICE O/P EST MOD 30 MIN: CPT | Mod: 25

## 2022-04-12 PROCEDURE — 20610 DRAIN/INJ JOINT/BURSA W/O US: CPT | Mod: LT

## 2022-04-12 PROCEDURE — 71100 X-RAY EXAM RIBS UNI 2 VIEWS: CPT

## 2022-04-12 RX ORDER — GABAPENTIN 100 MG/1
100 CAPSULE ORAL
Qty: 30 | Refills: 0 | Status: ACTIVE | COMMUNITY
Start: 2022-04-12 | End: 1900-01-01

## 2022-04-12 NOTE — DISCUSSION/SUMMARY
[de-identified] : MRI reviewed, will try Gabapentin at night, if not helpful, may need to consider Pain Management\par IMPRESSION:\par Multilevel lumbar degenerative disc disease and facet osteoarthrosis with grade I degenerative spondylolisthesis \par at L4-5 and a mild degenerative levoscoliosis.\par Disc bulge with a small left lateral recess disc herniation at L2-3 with mild compression of the left L3 nerve \par root.\par Small disc herniation at L1-2, disc bulge at L3-4 and disc herniation at L5-S1 without stenosis or nerve root \par compression.\par Grade I degenerative spondylolisthesis and posterior element degenerative changes at L4-5 without stenosis or \par nerve root compression.\par Modic type II endplate changes adjacent to the L5-S1 disc.

## 2022-04-12 NOTE — DATA REVIEWED
[MRI] : MRI [Lumbar Spine] : lumbar spine [I reviewed the films/CD and agree] : I reviewed the films/CD and agree

## 2022-04-12 NOTE — PROCEDURE
[Large Joint Injection] : Large joint injection [Left] : of the left [Knee] : knee [Pain] : pain [Alcohol] : alcohol [Betadine] : betadine [Orthovisc] : Orthovisc [#1] : series #1

## 2022-04-12 NOTE — REASON FOR VISIT
[FreeTextEntry2] : For Orthovisc #1 left knee. Also had a fall, hit left ribs. Has pain with inspiration but not short of breath

## 2022-04-12 NOTE — PHYSICAL EXAM
[Disc space narrowing] : Disc space narrowing [Spondylolithesis] : Spondylolithesis [5___] : hamstring 5[unfilled]/5 [Equivocal] : equivocal Althea [] : mildly antalgic [Bilateral] : knee bilaterally [AP] : anteroposterior [Lateral] : lateral [[place]] : skyline [Degenerative change] : Degenerative change [Left] : left rib [No bony abnormalities] : No bony abnormalities [FreeTextEntry3] : no chest contusion [FreeTextEntry8] : tender left ribs approx 7/8, no crepitance [de-identified] : she notes tingling/radicular pain down the right leg anterior to the shin [FreeTextEntry1] : 5-1 narrowing and stenosis, 4-5 spondylolisthesis [de-identified] : negative [de-identified] : the left knee is more symptomatic than the right [TWNoteComboBox7] : flexion 120 degrees [de-identified] : extension 3 degrees [FreeTextEntry9] : right knee mild , left knee severe medial narrowing

## 2022-04-12 NOTE — HISTORY OF PRESENT ILLNESS
[0] : 0 [1] : 1 [Orthovisc] : Orthovisc [Gradual] : gradual [de-identified] : 4-5-22 Known b/l knee oa. did well with orthovisc injections in 2020. Over the last few months pain has returned worse on the left knee but on the right as well she has been ambulating with a limp\par \par we have also seen her for her lower back pain which has become more problematic. she has right sided symptoms with radicular complaints down the right leg into the foot worse with prolonged standing and walking.  [] : no [FreeTextEntry1] : b/l knees and lower back [FreeTextEntry5] : back pain has gotten worse [de-identified] : Lt knee

## 2022-04-19 ENCOUNTER — APPOINTMENT (OUTPATIENT)
Dept: ORTHOPEDIC SURGERY | Facility: CLINIC | Age: 72
End: 2022-04-19
Payer: MEDICARE

## 2022-04-19 DIAGNOSIS — M54.16 RADICULOPATHY, LUMBAR REGION: ICD-10-CM

## 2022-04-19 PROCEDURE — 20610 DRAIN/INJ JOINT/BURSA W/O US: CPT

## 2022-04-19 PROCEDURE — 99213 OFFICE O/P EST LOW 20 MIN: CPT | Mod: 25

## 2022-04-19 RX ORDER — IBUPROFEN 800 MG/1
800 TABLET, FILM COATED ORAL 3 TIMES DAILY
Qty: 90 | Refills: 2 | Status: ACTIVE | COMMUNITY
Start: 2022-04-19 | End: 1900-01-01

## 2022-04-19 NOTE — HISTORY OF PRESENT ILLNESS
[2] : 2 [Orthovisc] : Orthovisc [de-identified] : 4-19-22- She is for continued orthovisc injection to the left knee (#2)\par she also notes continued rib pain. She has been taking ibuprofen is requesting rx and has noted some improvement in regards to her radicular pain since taking the gabapentin  [] : no [FreeTextEntry1] : Left Knee [de-identified] : 4/12/22 [FreeTextEntry5] : Orthovisc # 2\par Lot # [id number] ... exp:3/31/24 [de-identified] : Left Knee

## 2022-04-19 NOTE — PHYSICAL EXAM
[Left] : left knee [5___] : hamstring 5[unfilled]/5 [Negative] : negative Marisol's [] : non-antalgic [TWNoteComboBox7] : flexion 120 degrees [de-identified] : extension 0 degrees

## 2022-04-19 NOTE — PROCEDURE
[Large Joint Injection] : Large joint injection [Left] : of the left [Knee] : knee [Pain] : pain [Inflammation] : inflammation [Alcohol] : alcohol [Betadine] : betadine [Ethyl Chloride sprayed topically] : ethyl chloride sprayed topically [Sterile technique used] : sterile technique used [Orthovisc] : Orthovisc [#2] : series #2 [] : Patient tolerated procedure well [Call if redness, pain or fever occur] : call if redness, pain or fever occur [Apply ice for 15min out of every hour for the next 12-24 hours as tolerated] : apply ice for 15 minutes out of every hour for the next 12-24 hours as tolerated [Patient was advised to rest the joint(s) for ____ days] : patient was advised to rest the joint(s) for [unfilled] days [Previous OTC use and PT nontherapeutic] : patient has tried OTC's including aspirin, Ibuprofen, Aleve, etc or prescription NSAIDS, and/or exercises at home and/or physical therapy without satisfactory response [Patient had decreased mobility in the joint] : patient had decreased mobility in the joint [Risks, benefits, alternatives discussed / Verbal consent obtained] : the risks benefits, and alternatives have been discussed, and verbal consent was obtained

## 2022-04-26 ENCOUNTER — APPOINTMENT (OUTPATIENT)
Dept: ORTHOPEDIC SURGERY | Facility: CLINIC | Age: 72
End: 2022-04-26
Payer: MEDICARE

## 2022-04-26 PROCEDURE — 20610 DRAIN/INJ JOINT/BURSA W/O US: CPT | Mod: LT

## 2022-04-26 NOTE — HISTORY OF PRESENT ILLNESS
[2] : 2 [Orthovisc] : Orthovisc [de-identified] : 4-19-22- She is for continued orthovisc injection to the left knee (#3)\par  [] : no [FreeTextEntry1] : Left Knee [FreeTextEntry5] : Orthovisc # 2\par Lot # [id number] ... exp:3/31/24 [de-identified] : 4/12/22 [de-identified] : Left Knee

## 2022-04-26 NOTE — PROCEDURE
[Large Joint Injection] : Large joint injection [Left] : of the left [Knee] : knee [Pain] : pain [Inflammation] : inflammation [Alcohol] : alcohol [Betadine] : betadine [Ethyl Chloride sprayed topically] : ethyl chloride sprayed topically [Sterile technique used] : sterile technique used [Orthovisc] : Orthovisc [#3] : series #3 [] : Patient tolerated procedure well [Call if redness, pain or fever occur] : call if redness, pain or fever occur [Apply ice for 15min out of every hour for the next 12-24 hours as tolerated] : apply ice for 15 minutes out of every hour for the next 12-24 hours as tolerated [Patient was advised to rest the joint(s) for ____ days] : patient was advised to rest the joint(s) for [unfilled] days [Previous OTC use and PT nontherapeutic] : patient has tried OTC's including aspirin, Ibuprofen, Aleve, etc or prescription NSAIDS, and/or exercises at home and/or physical therapy without satisfactory response [Patient had decreased mobility in the joint] : patient had decreased mobility in the joint [Risks, benefits, alternatives discussed / Verbal consent obtained] : the risks benefits, and alternatives have been discussed, and verbal consent was obtained

## 2022-04-26 NOTE — DISCUSSION/SUMMARY
Addendum  created 05/31/18 0380 by Fred Peng MD    Order list changed, Order sets accessed       [de-identified] : will rx for ibuprofen discussed importance of ddep breaths

## 2022-04-26 NOTE — PHYSICAL EXAM
[Left] : left knee [5___] : hamstring 5[unfilled]/5 [Negative] : negative Marisol's [] : non-antalgic [TWNoteComboBox7] : flexion 120 degrees [de-identified] : extension 0 degrees

## 2022-05-02 ENCOUNTER — RX RENEWAL (OUTPATIENT)
Age: 72
End: 2022-05-02

## 2022-05-03 ENCOUNTER — APPOINTMENT (OUTPATIENT)
Dept: ORTHOPEDIC SURGERY | Facility: CLINIC | Age: 72
End: 2022-05-03
Payer: MEDICARE

## 2022-05-03 PROCEDURE — 20610 DRAIN/INJ JOINT/BURSA W/O US: CPT

## 2022-05-03 RX ORDER — GABAPENTIN 100 MG/1
100 CAPSULE ORAL TWICE DAILY
Qty: 60 | Refills: 1 | Status: ACTIVE | COMMUNITY
Start: 2022-05-03 | End: 1900-01-01

## 2022-05-03 NOTE — DISCUSSION/SUMMARY
[de-identified] : rx for gabapentin 100 bid for her lumbar radic, if that does not help she will call to schedule lesi with pain mgt

## 2022-05-03 NOTE — PROCEDURE
[Large Joint Injection] : Large joint injection [Left] : of the left [Knee] : knee [Pain] : pain [Inflammation] : inflammation [Alcohol] : alcohol [Betadine] : betadine [Ethyl Chloride sprayed topically] : ethyl chloride sprayed topically [Sterile technique used] : sterile technique used [Orthovisc] : Orthovisc [#4] : series #4 [] : Patient tolerated procedure well [Call if redness, pain or fever occur] : call if redness, pain or fever occur [Apply ice for 15min out of every hour for the next 12-24 hours as tolerated] : apply ice for 15 minutes out of every hour for the next 12-24 hours as tolerated [Patient was advised to rest the joint(s) for ____ days] : patient was advised to rest the joint(s) for [unfilled] days [Previous OTC use and PT nontherapeutic] : patient has tried OTC's including aspirin, Ibuprofen, Aleve, etc or prescription NSAIDS, and/or exercises at home and/or physical therapy without satisfactory response [Patient had decreased mobility in the joint] : patient had decreased mobility in the joint [Risks, benefits, alternatives discussed / Verbal consent obtained] : the risks benefits, and alternatives have been discussed, and verbal consent was obtained

## 2022-05-03 NOTE — PHYSICAL EXAM
[Left] : left knee [5___] : hamstring 5[unfilled]/5 [Negative] : negative Marisol's [] : non-antalgic [TWNoteComboBox7] : flexion 120 degrees [de-identified] : extension 0 degrees

## 2022-05-03 NOTE — HISTORY OF PRESENT ILLNESS
[2] : 2 [Orthovisc] : Orthovisc [de-identified] : 5-3-22- She is for continued orthovisc injection to the left knee (#4)\par  [] : no [FreeTextEntry1] : Left Knee [FreeTextEntry5] : Orthovisc # 2\par Lot # [id number] ... exp:3/31/24 [de-identified] : 4/12/22 [de-identified] : Left Knee

## 2022-05-26 ENCOUNTER — APPOINTMENT (OUTPATIENT)
Dept: PAIN MANAGEMENT | Facility: CLINIC | Age: 72
End: 2022-05-26
Payer: MEDICARE

## 2022-05-26 VITALS — WEIGHT: 170 LBS | HEIGHT: 65 IN | BODY MASS INDEX: 28.32 KG/M2

## 2022-05-26 PROCEDURE — 99214 OFFICE O/P EST MOD 30 MIN: CPT

## 2022-05-26 NOTE — PHYSICAL EXAM
[de-identified] : Constitutional; Appears well, no apparent distress\par Ability to communicate: Normal \par Respiratory: non-labored breathing\par Skin: No rash noted\par Head: Normocephalic, atraumatic\par Neck: no visible thyroid enlargement\par Eyes: Extraocular movements intact\par Neurologic: Alert and oriented x3\par Psychiatric: normal mood, affect and behavior \par \par  [] : positive sitting straight leg raise

## 2022-05-26 NOTE — HISTORY OF PRESENT ILLNESS
[Right Leg] : right leg [3] : 3 [10] : 10 [Dull/Aching] : dull/aching [Radiating] : radiating [Sharp] : sharp [Shooting] : shooting [Stabbing] : stabbing [Tingling] : tingling [Frequent] : frequent [Leisure] : leisure [Sleep] : sleep [Lying in bed] : lying in bed [FreeTextEntry1] : Initial HPI 05/26/2022: \par Pain started Oct 2021 and is on the right thigh and radiates down to the right anterior lower leg described as a throbbing/sharp/electrical pain with associated numbness and tingling. Tried gabapentin and ibuprofen with no relief. \par \par PMHx: fibromyalgia\par \par \par  [] : no [FreeTextEntry6] : numbness, leg tesha  [FreeTextEntry7] : back of thigh to the foot  [FreeTextEntry9] : being on back  [de-identified] : L MRI

## 2022-05-26 NOTE — DISCUSSION/SUMMARY
[de-identified] : After discussing various treatment options with the patient including but not limited to oral medications, physical therapy, exercise modalities as well as interventional spinal injections, we have decided with the following plan:\par \par - Continue Home exercises, stretching, activity modification, physical therapy, and conservative care.\par - Recommend L5-S1 Lumbar Epidural Steroid Injection under fluoroscopic guidance with image.\par - The risks, benefits and alternatives of the proposed procedure were explained in detail with the patient. The risks outlined include but are not limited to infection, bleeding, post-dural puncture headache, nerve injury, a temporary increase in pain, failure to resolve symptoms, allergic reaction, symptom recurrence, and possible elevation of blood sugar in diabetics. All questions were answered to patient's apparent satisfaction and he/she verbalized an understanding.\par - Patient is presenting with acute/sub-acute radicular pain with impairment in ADLs and functionality.  The pain has not responded to conservative care including NSAID therapy and/or physical therapy.  There is no bleeding tendency, unstable medical condition, or systemic infection.\par - Follow up in 1-2 weeks post injection for re-evaluation.\par

## 2022-06-07 LAB — SARS-COV-2 N GENE NPH QL NAA+PROBE: NOT DETECTED

## 2022-06-09 ENCOUNTER — APPOINTMENT (OUTPATIENT)
Dept: PAIN MANAGEMENT | Facility: CLINIC | Age: 72
End: 2022-06-09
Payer: MEDICARE

## 2022-06-09 PROCEDURE — 62323 NJX INTERLAMINAR LMBR/SAC: CPT

## 2022-06-09 NOTE — PROCEDURE
[FreeTextEntry3] : Date of Service: 06/09/2022 \par \par Account: [id number]\par \par Patient: THUY SHORE \par \par YOB: 1950\par \par Age: 72 year\par \par \par Surgeon:                                                         German Ascencio D.O.\par \par Pre-Operative Diagnosis:                             Lumbosacral radiculitis\par \par Post-Operative Diagnosis:                           Same\par \par Procedure:                                                      Interlaminar lumbar epidural steroid injection (L5-S1) under fluoroscopic guidance\par \par Anesthesia:                                                     Local with MAC\par \par \par This procedure was carried out using fluoroscopic guidance.  The risks and benefits of the procedure were discussed extensively with the patient.  The consent of the patient was obtained and the following procedure was performed.\par \par The patient was placed in the prone position.  The lumbar area was prepped and draped in a sterile fashion.  A timeout was performed with all essential staff present and the site and side were verified. Under AP view with slight cephalad-caudad angulation, the L5-S1 interspace was identified and marked.  Using sterile technique, the superficial skin was anesthetized with 1% Lidocaine without epinephrine.  A 20-gauge Tuohy needle was advanced into the epidural space under fluoroscopy using ncxop-htmnspnai-dwcal technique and using loss of resistance at the L5-S1 level.  After negative aspiration for heme or CSF, 4 cc of preservative free normal saline plus 12 mg of betamethasone were injected into the epidural space.\par \par In consideration of the current national shortage of contrast agents and after careful assessment of the R/B/A the decision was made to proceed with the above indicated procedure without the use of such.  This is in accord with current published pain societal guidelines for management of interventional pain procedures during this national shortage.\par \par The needle was subsequently removed.  Anesthesia personnel were present throughout the procedure.\par \par The patient tolerated the procedure well and was instructed to contact me immediately if there were any problems.\par \par German Ascencio D.O.\par

## 2022-06-23 ENCOUNTER — APPOINTMENT (OUTPATIENT)
Dept: PAIN MANAGEMENT | Facility: CLINIC | Age: 72
End: 2022-06-23
Payer: MEDICARE

## 2022-06-23 VITALS — WEIGHT: 172 LBS | HEIGHT: 65 IN | BODY MASS INDEX: 28.66 KG/M2

## 2022-06-23 PROCEDURE — 99214 OFFICE O/P EST MOD 30 MIN: CPT

## 2022-06-23 NOTE — DISCUSSION/SUMMARY
[de-identified] : After discussing various treatment options with the patient including but not limited to oral medications, physical therapy, exercise modalities as well as interventional spinal injections, we have decided with the following plan:\par \par - Continue Home exercises, stretching, activity modification, physical therapy, and conservative care.\par - MRI report and/or images was reviewed and discussed with the patient.\par - Recommend L5-S1 Lumbar Epidural Steroid Injection under fluoroscopic guidance with image. (right)\par - The risks, benefits and alternatives of the proposed procedure were explained in detail with the patient. The risks outlined include but are not limited to infection, bleeding, post-dural puncture headache, nerve injury, a temporary increase in pain, failure to resolve symptoms, allergic reaction, symptom recurrence, and possible elevation of blood sugar in diabetics. All questions were answered to patient's apparent satisfaction and he/she verbalized an understanding.\par - Patient is presenting with acute/sub-acute radicular pain with impairment in ADLs and functionality.  The pain has not responded to conservative care including NSAID therapy and/or physical therapy.  There is no bleeding tendency, unstable medical condition, or systemic infection.\par - Follow up in 1-2 weeks post injection for re-evaluation.\par

## 2022-06-23 NOTE — PHYSICAL EXAM
[de-identified] : Constitutional; Appears well, no apparent distress\par Ability to communicate: Normal \par Respiratory: non-labored breathing\par Skin: No rash noted\par Head: Normocephalic, atraumatic\par Neck: no visible thyroid enlargement\par Eyes: Extraocular movements intact\par Neurologic: Alert and oriented x3\par Psychiatric: normal mood, affect and behavior \par \par  [] : no lumbar paraspinal tenderness

## 2022-06-23 NOTE — HISTORY OF PRESENT ILLNESS
[Right Leg] : right leg [0] : 0 [4] : 4 [Dull/Aching] : dull/aching [Radiating] : radiating [Tingling] : tingling [Frequent] : frequent [Leisure] : leisure [Sleep] : sleep [Injection therapy] : injection therapy [Lying in bed] : lying in bed [FreeTextEntry1] : 06/23/2022 :s/p L5-S1 LESI on 6/9/22 with 50% relief and improvement of ADLs. Pain was completely good for a week and then started to return but now the pain is still much improved. \par \par Initial HPI 05/26/2022: \par Pain started Oct 2021 and is on the right thigh and radiates down to the right anterior lower leg described as a throbbing/sharp/electrical pain with associated numbness and tingling. Tried gabapentin and ibuprofen with no relief. \par \par PMHx: fibromyalgia\par \par \par  [] : no [FreeTextEntry7] : right back and lateral  of thigh, right shin  [de-identified] : L MRI

## 2022-07-12 LAB — SARS-COV-2 N GENE NPH QL NAA+PROBE: NOT DETECTED

## 2022-07-14 ENCOUNTER — APPOINTMENT (OUTPATIENT)
Dept: PAIN MANAGEMENT | Facility: CLINIC | Age: 72
End: 2022-07-14

## 2022-07-14 DIAGNOSIS — M54.17 RADICULOPATHY, LUMBOSACRAL REGION: ICD-10-CM

## 2022-07-14 PROCEDURE — 62323 NJX INTERLAMINAR LMBR/SAC: CPT

## 2022-07-14 NOTE — PROCEDURE
[FreeTextEntry3] : Date of Service: 07/14/2022 \par \par Account: [id number]\par \par Patient: THUY SHORE \par \par YOB: 1950\par \par Age: 72 year\par \par \par Surgeon:                                                         German Ascencio D.O.\par \par Pre-Operative Diagnosis:                             Lumbosacral radiculitis\par \par Post-Operative Diagnosis:                           Same\par \par Procedure:                                                      Interlaminar lumbar epidural steroid injection (L5-S1) under fluoroscopic guidance\par \par Anesthesia:                                                     Local with MAC\par \par \par This procedure was carried out using fluoroscopic guidance.  The risks and benefits of the procedure were discussed extensively with the patient.  The consent of the patient was obtained and the following procedure was performed.\par \par The patient was placed in the prone position.  The lumbar area was prepped and draped in a sterile fashion.  A timeout was performed with all essential staff present and the site and side were verified. Under AP view with slight cephalad-caudad angulation, the L5-S1 interspace was identified and marked.  Using sterile technique, the superficial skin was anesthetized with 1% Lidocaine without epinephrine.  A 20-gauge Tuohy needle was advanced into the epidural space under fluoroscopy using loclp-chtjahpvj-aiwqh technique and using loss of resistance at the L5-S1 level.  After negative aspiration for heme or CSF, an epidurogram was obtained using 2-3 cc Omnipaque contrast injected under live fluoroscopy, confirming epidural placement of the needle.  \par \par Epidurogram showed no evidence of intrathecal or intravascular flow, and good evidence of bilateral epidural flow from L3-S2 levels.  After this, 4 cc of preservative free normal saline plus 12 mg of betamethasone were injected into the epidural space.\par \par The needle was subsequently removed.  Anesthesia personnel were present throughout the procedure.\par \par The patient tolerated the procedure well and was instructed to contact me immediately if there were any problems.\par \par German Ascencio D.O.\par

## 2022-07-28 ENCOUNTER — APPOINTMENT (OUTPATIENT)
Dept: PAIN MANAGEMENT | Facility: CLINIC | Age: 72
End: 2022-07-28

## 2022-08-18 ENCOUNTER — APPOINTMENT (OUTPATIENT)
Dept: PAIN MANAGEMENT | Facility: CLINIC | Age: 72
End: 2022-08-18

## 2022-08-18 VITALS — WEIGHT: 172 LBS | HEIGHT: 65 IN | BODY MASS INDEX: 28.66 KG/M2

## 2022-08-18 DIAGNOSIS — M54.50 LOW BACK PAIN, UNSPECIFIED: ICD-10-CM

## 2022-08-18 DIAGNOSIS — M54.17 RADICULOPATHY, LUMBOSACRAL REGION: ICD-10-CM

## 2022-08-18 PROCEDURE — 99213 OFFICE O/P EST LOW 20 MIN: CPT

## 2022-08-18 NOTE — DISCUSSION/SUMMARY
[de-identified] : After discussing various treatment options with the patient including but not limited to oral medications, physical therapy, exercise modalities as well as interventional spinal injections, we have decided with the following plan:\par \par - Continue Home exercises, stretching, activity modification, physical therapy, and conservative care.\par - MRI report and/or images was reviewed and discussed with the patient.\par - Recommend L5-S1 Lumbar Epidural Steroid Injection under fluoroscopic guidance with image. (right)\par - The risks, benefits and alternatives of the proposed procedure were explained in detail with the patient. The risks outlined include but are not limited to infection, bleeding, post-dural puncture headache, nerve injury, a temporary increase in pain, failure to resolve symptoms, allergic reaction, symptom recurrence, and possible elevation of blood sugar in diabetics. All questions were answered to patient's apparent satisfaction and he/she verbalized an understanding.\par - Patient is presenting with acute/sub-acute radicular pain with impairment in ADLs and functionality.  The pain has not responded to conservative care including NSAID therapy and/or physical therapy.  There is no bleeding tendency, unstable medical condition, or systemic infection.\par - Follow up in 1-2 weeks post injection for re-evaluation.\par - Will call to schedule.\par

## 2022-08-18 NOTE — PHYSICAL EXAM

## 2022-08-18 NOTE — HISTORY OF PRESENT ILLNESS
[Right Leg] : right leg [0] : 0 [Dull/Aching] : dull/aching [Radiating] : radiating [Tingling] : tingling [Frequent] : frequent [Leisure] : leisure [Sleep] : sleep [Injection therapy] : injection therapy [Lying in bed] : lying in bed [3] : 3 [Meds] : meds [FreeTextEntry1] : 08/18/2022: s/p L5-S1 LESI on 7/14/22 with >90% relief and improvement of ADLs. Overall is feeling great. Slight tingling in the right lateral thigh. \par \par 06/23/2022: s/p L5-S1 LESI on 6/9/22 with 50% relief and improvement of ADLs. Pain was completely good for a week and then started to return but now the pain is still much improved. \par \par Initial HPI 05/26/2022: \par Pain started Oct 2021 and is on the right thigh and radiates down to the right anterior lower leg described as a throbbing/sharp/electrical pain with associated numbness and tingling. Tried gabapentin and ibuprofen with no relief. \par \par PMHx: fibromyalgia\par \par \par  [] : no [FreeTextEntry7] : right lateral of thigh, right shin  [FreeTextEntry9] : advil  [de-identified] : L MRI

## 2022-09-27 ENCOUNTER — NON-APPOINTMENT (OUTPATIENT)
Age: 72
End: 2022-09-27

## 2022-09-27 ENCOUNTER — APPOINTMENT (OUTPATIENT)
Dept: CARDIOLOGY | Facility: CLINIC | Age: 72
End: 2022-09-27

## 2022-09-27 VITALS
HEIGHT: 65 IN | WEIGHT: 173 LBS | HEART RATE: 71 BPM | BODY MASS INDEX: 28.82 KG/M2 | OXYGEN SATURATION: 97 % | SYSTOLIC BLOOD PRESSURE: 133 MMHG | DIASTOLIC BLOOD PRESSURE: 81 MMHG

## 2022-09-27 DIAGNOSIS — E78.5 HYPERLIPIDEMIA, UNSPECIFIED: ICD-10-CM

## 2022-09-27 PROCEDURE — 93000 ELECTROCARDIOGRAM COMPLETE: CPT

## 2022-09-27 PROCEDURE — 99215 OFFICE O/P EST HI 40 MIN: CPT

## 2022-11-01 ENCOUNTER — APPOINTMENT (OUTPATIENT)
Dept: ORTHOPEDIC SURGERY | Facility: CLINIC | Age: 72
End: 2022-11-01

## 2022-11-01 VITALS — HEIGHT: 65 IN | BODY MASS INDEX: 28.82 KG/M2 | WEIGHT: 173 LBS

## 2022-11-01 DIAGNOSIS — M25.512 PAIN IN RIGHT SHOULDER: ICD-10-CM

## 2022-11-01 DIAGNOSIS — M25.511 PAIN IN RIGHT SHOULDER: ICD-10-CM

## 2022-11-01 PROCEDURE — 99213 OFFICE O/P EST LOW 20 MIN: CPT | Mod: 25

## 2022-11-01 PROCEDURE — 20610 DRAIN/INJ JOINT/BURSA W/O US: CPT

## 2022-11-01 PROCEDURE — 73030 X-RAY EXAM OF SHOULDER: CPT | Mod: 50

## 2022-11-01 NOTE — PROCEDURE
[Large Joint Injection] : Large joint injection [Right] : of the right [Subacromial Space] : subacromial space [Pain] : pain [Alcohol] : alcohol [Betadine] : betadine [Ethyl Chloride sprayed topically] : ethyl chloride sprayed topically [___ cc    6mg] :  Betamethasone (Celestone) ~Vcc of 6mg [___ cc    1%] : Lidocaine ~Vcc of 1%

## 2022-11-01 NOTE — PHYSICAL EXAM
[] : no swelling [Bilateral] : shoulder bilaterally [There are no fractures, subluxations or dislocations. No significant abnormalities are seen] : There are no fractures, subluxations or dislocations. No significant abnormalities are seen

## 2022-11-01 NOTE — HISTORY OF PRESENT ILLNESS
[6] : 6 [0] : 0 [Dull/Aching] : dull/aching [Sleep] : sleep [de-identified] : Has right worse than left shoulder pain past few days. Today a bit less. No injury. Some neck soreness at times, no numbness in arms [] : no [FreeTextEntry1] : B.L shoulders [FreeTextEntry5] : Pt states she started to feel extreme pain in her right shoulder last Saturday. She would also like to be seen for her left shoulder, she had pain only when the area has pressure.

## 2022-11-02 ENCOUNTER — RX RENEWAL (OUTPATIENT)
Age: 72
End: 2022-11-02

## 2022-11-07 ENCOUNTER — APPOINTMENT (OUTPATIENT)
Dept: ORTHOPEDIC SURGERY | Facility: CLINIC | Age: 72
End: 2022-11-07

## 2022-11-16 ENCOUNTER — OFFICE (OUTPATIENT)
Dept: URBAN - METROPOLITAN AREA CLINIC 109 | Facility: CLINIC | Age: 72
Setting detail: OPHTHALMOLOGY
End: 2022-11-16
Payer: MEDICARE

## 2022-11-16 DIAGNOSIS — H02.834: ICD-10-CM

## 2022-11-16 DIAGNOSIS — H04.121: ICD-10-CM

## 2022-11-16 DIAGNOSIS — H04.123: ICD-10-CM

## 2022-11-16 DIAGNOSIS — H04.122: ICD-10-CM

## 2022-11-16 DIAGNOSIS — H02.403: ICD-10-CM

## 2022-11-16 DIAGNOSIS — H02.831: ICD-10-CM

## 2022-11-16 DIAGNOSIS — H43.393: ICD-10-CM

## 2022-11-16 DIAGNOSIS — H40.013: ICD-10-CM

## 2022-11-16 DIAGNOSIS — D31.31: ICD-10-CM

## 2022-11-16 PROCEDURE — 92014 COMPRE OPH EXAM EST PT 1/>: CPT | Performed by: OPHTHALMOLOGY

## 2022-11-16 ASSESSMENT — REFRACTION_MANIFEST
OS_VA1: 20/30
OS_SPHERE: +0.75
OS_VA1: 20/20-
OS_CYLINDER: -0.50
OD_ADD: +2.50
OS_ADD: +2.50
OS_AXIS: 26
OD_SPHERE: PLANO
OD_VA1: 20/20
OD_AXIS: 145
OD_SPHERE: +0.50
OS_CYLINDER: -0.75
OS_VA1: 20/25-
OS_SPHERE: +0.25
OD_VA1: 20/NI
OD_AXIS: 135
OD_SPHERE: +1.00
OD_ADD: +2.50
OS_AXIS: 40
OD_CYLINDER: -1.00
OS_AXIS: 45
OS_SPHERE: PLANO
OS_ADD: +2.50
OS_CYLINDER: -0.75
OD_CYLINDER: -0.75
OD_CYLINDER: -0.50
OD_AXIS: 160
OD_VA1: 20/25-

## 2022-11-16 ASSESSMENT — REFRACTION_AUTOREFRACTION
OS_SPHERE: +0.50
OS_CYLINDER: -1.25
OD_SPHERE: +0.50
OD_AXIS: 144
OS_AXIS: 24
OD_CYLINDER: -1.50

## 2022-11-16 ASSESSMENT — TONOMETRY
OS_IOP_MMHG: 17
OD_IOP_MMHG: 17

## 2022-11-16 ASSESSMENT — SPHEQUIV_DERIVED
OD_SPHEQUIV: 0.5
OD_SPHEQUIV: 0.125
OD_SPHEQUIV: -0.25
OS_SPHEQUIV: -0.125
OS_SPHEQUIV: -0.125
OS_SPHEQUIV: 0.375

## 2022-11-16 ASSESSMENT — REFRACTION_CURRENTRX
OD_OVR_VA: 20/
OS_AXIS: 16
OD_VPRISM_DIRECTION: PROGS
OD_AXIS: 157
OD_CYLINDER: -1.50
OS_SPHERE: +1.00
OS_ADD: +2.00
OD_SPHERE: +1.25
OS_VPRISM_DIRECTION: PROGS
OS_CYLINDER: -1.00
OD_OVR_VA: 20/
OD_ADD: +2.00
OD_SPHERE: +1.00
OD_AXIS: 166
OS_AXIS: 20
OS_OVR_VA: 20/
OS_OVR_VA: 20/
OS_SPHERE: +1.00
OS_CYLINDER: -1.00
OD_CYLINDER: -1.25

## 2022-11-16 ASSESSMENT — AXIALLENGTH_DERIVED
OD_AL: 23.7433
OS_AL: 24.0286
OD_AL: 24.043
OD_AL: 23.8922
OS_AL: 24.2324
OS_AL: 24.2324

## 2022-11-16 ASSESSMENT — KERATOMETRY
OD_K2POWER_DIOPTERS: 43.87
OS_K1POWER_DIOPTERS: 41.37
OD_AXISANGLE_DEGREES: 67
OD_K1POWER_DIOPTERS: 41.25
OS_AXISANGLE_DEGREES: 104
OS_K2POWER_DIOPTERS: 42.50

## 2022-11-16 ASSESSMENT — DECREASING TEAR LAKE - SEVERITY SCORE
OD_DEC_TEARLAKE: 2+
OS_DEC_TEARLAKE: 2+

## 2022-11-16 ASSESSMENT — PACHYMETRY
OS_CT_UM: 535
OD_CT_UM: 533
OS_CT_CORRECTION: 1
OD_CT_CORRECTION: 1

## 2022-11-16 ASSESSMENT — CONFRONTATIONAL VISUAL FIELD TEST (CVF)
OD_FINDINGS: FULL
OS_FINDINGS: FULL

## 2022-11-16 ASSESSMENT — LID POSITION - DERMATOCHALASIS
OD_DERMATOCHALASIS: 1+
OS_DERMATOCHALASIS: 1+

## 2022-11-16 ASSESSMENT — LID EXAM ASSESSMENTS: OS_COMMENTS: 2 SMALL CYSTIC LESIONS

## 2022-11-16 ASSESSMENT — VISUAL ACUITY
OD_BCVA: 20/25
OS_BCVA: 20/25-2

## 2022-12-15 ENCOUNTER — OFFICE (OUTPATIENT)
Dept: URBAN - METROPOLITAN AREA CLINIC 109 | Facility: CLINIC | Age: 72
Setting detail: OPHTHALMOLOGY
End: 2022-12-15
Payer: MEDICARE

## 2022-12-15 DIAGNOSIS — H02.815: ICD-10-CM

## 2022-12-15 DIAGNOSIS — H02.811: ICD-10-CM

## 2022-12-15 DIAGNOSIS — H02.814: ICD-10-CM

## 2022-12-15 DIAGNOSIS — H02.403: ICD-10-CM

## 2022-12-15 DIAGNOSIS — H02.812: ICD-10-CM

## 2022-12-15 PROBLEM — H02.834 DERMATOCHALASIS; RIGHT UPPER LID, LEFT UPPER LID: Status: ACTIVE | Noted: 2022-12-15

## 2022-12-15 PROBLEM — H02.831 DERMATOCHALASIS; RIGHT UPPER LID, LEFT UPPER LID: Status: ACTIVE | Noted: 2022-12-15

## 2022-12-15 PROCEDURE — 99213 OFFICE O/P EST LOW 20 MIN: CPT | Performed by: OPHTHALMOLOGY

## 2022-12-15 ASSESSMENT — REFRACTION_AUTOREFRACTION
OS_SPHERE: +0.50
OS_AXIS: 24
OD_AXIS: 144
OD_CYLINDER: -1.50
OD_SPHERE: +0.50
OS_CYLINDER: -1.25

## 2022-12-15 ASSESSMENT — KERATOMETRY
OS_AXISANGLE_DEGREES: 104
OS_K1POWER_DIOPTERS: 41.37
OS_K2POWER_DIOPTERS: 42.50
OD_K1POWER_DIOPTERS: 41.25
OD_K2POWER_DIOPTERS: 43.87
OD_AXISANGLE_DEGREES: 67

## 2022-12-15 ASSESSMENT — AXIALLENGTH_DERIVED
OD_AL: 24.043
OS_AL: 24.2324

## 2022-12-15 ASSESSMENT — LID POSITION - PTOSIS
OS_PTOSIS: LUL 1+
OD_PTOSIS: RUL 1+

## 2022-12-15 ASSESSMENT — SPHEQUIV_DERIVED
OS_SPHEQUIV: -0.125
OD_SPHEQUIV: -0.25

## 2022-12-15 ASSESSMENT — LID POSITION - DERMATOCHALASIS
OD_DERMATOCHALASIS: RUL 1+
OS_DERMATOCHALASIS: LUL 1+

## 2022-12-15 ASSESSMENT — VISUAL ACUITY
OS_BCVA: 20/25-2
OD_BCVA: 20/25

## 2022-12-15 ASSESSMENT — DECREASING TEAR LAKE - SEVERITY SCORE
OS_DEC_TEARLAKE: 2+
OD_DEC_TEARLAKE: 2+

## 2022-12-15 ASSESSMENT — LID EXAM ASSESSMENTS: OS_COMMENTS: 2 SMALL CYSTIC LESIONS

## 2023-02-21 ENCOUNTER — RX RENEWAL (OUTPATIENT)
Age: 73
End: 2023-02-21

## 2023-04-20 ENCOUNTER — APPOINTMENT (OUTPATIENT)
Dept: ORTHOPEDIC SURGERY | Facility: CLINIC | Age: 73
End: 2023-04-20
Payer: MEDICARE

## 2023-04-20 VITALS — WEIGHT: 173 LBS | BODY MASS INDEX: 28.82 KG/M2 | HEIGHT: 65 IN

## 2023-04-20 PROCEDURE — J3490M: CUSTOM | Mod: NC

## 2023-04-20 PROCEDURE — 20610 DRAIN/INJ JOINT/BURSA W/O US: CPT | Mod: 50

## 2023-04-20 PROCEDURE — 73562 X-RAY EXAM OF KNEE 3: CPT | Mod: 50

## 2023-04-20 PROCEDURE — 99214 OFFICE O/P EST MOD 30 MIN: CPT | Mod: 25

## 2023-04-20 NOTE — ASSESSMENT
[FreeTextEntry1] : will administer csi both knees today and see how she responds possible repeat ha if pain persists

## 2023-04-20 NOTE — HISTORY OF PRESENT ILLNESS
[Gradual] : gradual [Dull/Aching] : dull/aching [Localized] : localized [Throbbing] : throbbing [Sitting] : sitting [Stairs] : stairs [de-identified] : 4-20-23- known b/l knee oa. did well with ha injections completed 5/3/2022. improved pain and ability to tolerate adls for greater than 6 months. recently pain has returned [] : no [FreeTextEntry1] : b/l knees  [FreeTextEntry5] : patient has been dealing with knee pain for years bit feels that pain and stiffness has gotten worse. left knee is worse.  [FreeTextEntry6] : weakness  [de-identified] : getting up from a seated position

## 2023-04-20 NOTE — PROCEDURE
[Large Joint Injection] : Large joint injection [Bilateral] : bilaterally of the [Knee] : knee [Pain] : pain [Inflammation] : inflammation [X-ray evidence of Osteoarthritis on this or prior visit] : x-ray evidence of Osteoarthritis on this or prior visit [Alcohol] : alcohol [Betadine] : betadine [Ethyl Chloride sprayed topically] : ethyl chloride sprayed topically [Sterile technique used] : sterile technique used [___ cc    6mg] :  Betamethasone (Celestone) ~Vcc of 6mg [___ cc    0.5%] : Bupivacaine (Marcaine) ~Vcc of 0.5%  [] : Patient tolerated procedure well [Call if redness, pain or fever occur] : call if redness, pain or fever occur [Apply ice for 15min out of every hour for the next 12-24 hours as tolerated] : apply ice for 15 minutes out of every hour for the next 12-24 hours as tolerated [Patient was advised to rest the joint(s) for ____ days] : patient was advised to rest the joint(s) for [unfilled] days [Previous OTC use and PT nontherapeutic] : patient has tried OTC's including aspirin, Ibuprofen, Aleve, etc or prescription NSAIDS, and/or exercises at home and/or physical therapy without satisfactory response [Patient had decreased mobility in the joint] : patient had decreased mobility in the joint [Risks, benefits, alternatives discussed / Verbal consent obtained] : the risks benefits, and alternatives have been discussed, and verbal consent was obtained

## 2023-04-20 NOTE — PHYSICAL EXAM
[Bilateral] : knee bilaterally [NL (0)] : extension 0 degrees [5___] : hamstring 5[unfilled]/5 [Equivocal] : equivocal Althea [] : mildly antalgic [TWNoteComboBox7] : flexion 120 degrees

## 2023-04-25 ENCOUNTER — NON-APPOINTMENT (OUTPATIENT)
Age: 73
End: 2023-04-25

## 2023-04-25 ENCOUNTER — APPOINTMENT (OUTPATIENT)
Dept: CARDIOLOGY | Facility: CLINIC | Age: 73
End: 2023-04-25
Payer: MEDICARE

## 2023-04-25 VITALS
HEIGHT: 65 IN | OXYGEN SATURATION: 99 % | DIASTOLIC BLOOD PRESSURE: 76 MMHG | BODY MASS INDEX: 28.82 KG/M2 | HEART RATE: 69 BPM | WEIGHT: 173 LBS | SYSTOLIC BLOOD PRESSURE: 133 MMHG

## 2023-04-25 DIAGNOSIS — I10 ESSENTIAL (PRIMARY) HYPERTENSION: ICD-10-CM

## 2023-04-25 DIAGNOSIS — R00.2 PALPITATIONS: ICD-10-CM

## 2023-04-25 DIAGNOSIS — I49.3 VENTRICULAR PREMATURE DEPOLARIZATION: ICD-10-CM

## 2023-04-25 PROCEDURE — 93000 ELECTROCARDIOGRAM COMPLETE: CPT

## 2023-04-25 PROCEDURE — 99215 OFFICE O/P EST HI 40 MIN: CPT

## 2023-05-05 ENCOUNTER — RX RENEWAL (OUTPATIENT)
Age: 73
End: 2023-05-05

## 2023-05-18 ENCOUNTER — APPOINTMENT (OUTPATIENT)
Dept: ORTHOPEDIC SURGERY | Facility: CLINIC | Age: 73
End: 2023-05-18
Payer: MEDICARE

## 2023-05-18 PROCEDURE — 20610 DRAIN/INJ JOINT/BURSA W/O US: CPT | Mod: 50

## 2023-05-18 PROCEDURE — 99213 OFFICE O/P EST LOW 20 MIN: CPT | Mod: 25

## 2023-05-18 NOTE — HISTORY OF PRESENT ILLNESS
[Gradual] : gradual [Dull/Aching] : dull/aching [Localized] : localized [Throbbing] : throbbing [Sitting] : sitting [Stairs] : stairs [de-identified] : 5-18-23- for b/l knee orthovisc #1\par \par 4-20-23- known b/l knee oa. did well with ha injections completed 5/3/2022. improved pain and ability to tolerate adls for greater than 6 months. recently pain has returned [] : no [FreeTextEntry1] : b/l knees  [FreeTextEntry5] : patient has been dealing with knee pain for years bit feels that pain and stiffness has gotten worse. left knee is worse.  [FreeTextEntry6] : weakness  [de-identified] : getting up from a seated position

## 2023-05-18 NOTE — PROCEDURE
[Large Joint Injection] : Large joint injection [Bilateral] : bilaterally of the [Knee] : knee [Pain] : pain [Inflammation] : inflammation [X-ray evidence of Osteoarthritis on this or prior visit] : x-ray evidence of Osteoarthritis on this or prior visit [Alcohol] : alcohol [Betadine] : betadine [Ethyl Chloride sprayed topically] : ethyl chloride sprayed topically [Sterile technique used] : sterile technique used [Orthovisc (30mg)] : 30mg of Orthovisc [#1] : series #1 [] : Patient tolerated procedure well [Call if redness, pain or fever occur] : call if redness, pain or fever occur [Apply ice for 15min out of every hour for the next 12-24 hours as tolerated] : apply ice for 15 minutes out of every hour for the next 12-24 hours as tolerated [Patient was advised to rest the joint(s) for ____ days] : patient was advised to rest the joint(s) for [unfilled] days [Previous OTC use and PT nontherapeutic] : patient has tried OTC's including aspirin, Ibuprofen, Aleve, etc or prescription NSAIDS, and/or exercises at home and/or physical therapy without satisfactory response [Patient had decreased mobility in the joint] : patient had decreased mobility in the joint [Risks, benefits, alternatives discussed / Verbal consent obtained] : the risks benefits, and alternatives have been discussed, and verbal consent was obtained

## 2023-05-18 NOTE — PHYSICAL EXAM
[Bilateral] : knee bilaterally [NL (0)] : extension 0 degrees [5___] : hamstring 5[unfilled]/5 [Equivocal] : equivocal Althea [] : negative Lachmann [TWNoteComboBox7] : flexion 120 degrees

## 2023-05-25 ENCOUNTER — APPOINTMENT (OUTPATIENT)
Dept: ORTHOPEDIC SURGERY | Facility: CLINIC | Age: 73
End: 2023-05-25
Payer: MEDICARE

## 2023-05-25 PROCEDURE — 20610 DRAIN/INJ JOINT/BURSA W/O US: CPT | Mod: 50

## 2023-05-25 NOTE — HISTORY OF PRESENT ILLNESS
[Gradual] : gradual [Dull/Aching] : dull/aching [Localized] : localized [Throbbing] : throbbing [Sitting] : sitting [Stairs] : stairs [de-identified] : 5-25-23- for b/l knee orthovisc #2\par \par 4-20-23- known b/l knee oa. did well with ha injections completed 5/3/2022. improved pain and ability to tolerate adls for greater than 6 months. recently pain has returned [] : no [FreeTextEntry1] : b/l knees  [FreeTextEntry5] : patient has been dealing with knee pain for years bit feels that pain and stiffness has gotten worse. left knee is worse.  [FreeTextEntry6] : weakness  [de-identified] : getting up from a seated position

## 2023-05-25 NOTE — PROCEDURE
[Large Joint Injection] : Large joint injection [Bilateral] : bilaterally of the [Knee] : knee [Pain] : pain [Inflammation] : inflammation [X-ray evidence of Osteoarthritis on this or prior visit] : x-ray evidence of Osteoarthritis on this or prior visit [Alcohol] : alcohol [Betadine] : betadine [Ethyl Chloride sprayed topically] : ethyl chloride sprayed topically [Sterile technique used] : sterile technique used [Orthovisc (30mg)] : 30mg of Orthovisc [#2] : series #2 [] : Patient tolerated procedure well [Call if redness, pain or fever occur] : call if redness, pain or fever occur [Apply ice for 15min out of every hour for the next 12-24 hours as tolerated] : apply ice for 15 minutes out of every hour for the next 12-24 hours as tolerated [Patient was advised to rest the joint(s) for ____ days] : patient was advised to rest the joint(s) for [unfilled] days [Previous OTC use and PT nontherapeutic] : patient has tried OTC's including aspirin, Ibuprofen, Aleve, etc or prescription NSAIDS, and/or exercises at home and/or physical therapy without satisfactory response [Patient had decreased mobility in the joint] : patient had decreased mobility in the joint [Risks, benefits, alternatives discussed / Verbal consent obtained] : the risks benefits, and alternatives have been discussed, and verbal consent was obtained

## 2023-06-01 ENCOUNTER — APPOINTMENT (OUTPATIENT)
Dept: ORTHOPEDIC SURGERY | Facility: CLINIC | Age: 73
End: 2023-06-01
Payer: MEDICARE

## 2023-06-01 PROCEDURE — 99212 OFFICE O/P EST SF 10 MIN: CPT | Mod: 25

## 2023-06-01 PROCEDURE — 20610 DRAIN/INJ JOINT/BURSA W/O US: CPT | Mod: 50

## 2023-06-01 NOTE — HISTORY OF PRESENT ILLNESS
[de-identified] : 5-25-23- for b/l knee orthovisc #2\par \par 4-20-23- known b/l knee oa. did well with ha injections completed 5/3/2022. improved pain and ability to tolerate adls for greater than 6 months. recently pain has returned [Gradual] : gradual [Dull/Aching] : dull/aching [Localized] : localized [Throbbing] : throbbing [Sitting] : sitting [Stairs] : stairs [] : no [FreeTextEntry1] : b/l knees  [FreeTextEntry5] : patient has been dealing with knee pain for years bit feels that pain and stiffness has gotten worse. left knee is worse.  [FreeTextEntry6] : weakness  [de-identified] : getting up from a seated position

## 2023-06-01 NOTE — PROCEDURE
[Large Joint Injection] : Large joint injection [Bilateral] : bilaterally of the [Knee] : knee [Pain] : pain [Inflammation] : inflammation [X-ray evidence of Osteoarthritis on this or prior visit] : x-ray evidence of Osteoarthritis on this or prior visit [Alcohol] : alcohol [Betadine] : betadine [Ethyl Chloride sprayed topically] : ethyl chloride sprayed topically [Sterile technique used] : sterile technique used [Orthovisc (30mg)] : 30mg of Orthovisc [#3] : series #3 [] : Patient tolerated procedure well [Call if redness, pain or fever occur] : call if redness, pain or fever occur [Apply ice for 15min out of every hour for the next 12-24 hours as tolerated] : apply ice for 15 minutes out of every hour for the next 12-24 hours as tolerated [Patient was advised to rest the joint(s) for ____ days] : patient was advised to rest the joint(s) for [unfilled] days [Previous OTC use and PT nontherapeutic] : patient has tried OTC's including aspirin, Ibuprofen, Aleve, etc or prescription NSAIDS, and/or exercises at home and/or physical therapy without satisfactory response [Patient had decreased mobility in the joint] : patient had decreased mobility in the joint [Risks, benefits, alternatives discussed / Verbal consent obtained] : the risks benefits, and alternatives have been discussed, and verbal consent was obtained

## 2023-06-08 ENCOUNTER — APPOINTMENT (OUTPATIENT)
Dept: ORTHOPEDIC SURGERY | Facility: CLINIC | Age: 73
End: 2023-06-08
Payer: MEDICARE

## 2023-06-08 PROCEDURE — 99212 OFFICE O/P EST SF 10 MIN: CPT | Mod: 25

## 2023-06-08 PROCEDURE — 20610 DRAIN/INJ JOINT/BURSA W/O US: CPT | Mod: 50

## 2023-06-08 NOTE — HISTORY OF PRESENT ILLNESS
[de-identified] : 5-25-23- for b/l knee orthovisc #2\par \par 4-20-23- known b/l knee oa. did well with ha injections completed 5/3/2022. improved pain and ability to tolerate adls for greater than 6 months. recently pain has returned [Gradual] : gradual [Dull/Aching] : dull/aching [Localized] : localized [Throbbing] : throbbing [Sitting] : sitting [Stairs] : stairs [] : no [FreeTextEntry1] : b/l knees  [FreeTextEntry5] : patient has been dealing with knee pain for years bit feels that pain and stiffness has gotten worse. left knee is worse.  [FreeTextEntry6] : weakness  [de-identified] : getting up from a seated position

## 2023-06-08 NOTE — PROCEDURE
[Large Joint Injection] : Large joint injection [Bilateral] : bilaterally of the [Knee] : knee [Pain] : pain [Inflammation] : inflammation [X-ray evidence of Osteoarthritis on this or prior visit] : x-ray evidence of Osteoarthritis on this or prior visit [Alcohol] : alcohol [Betadine] : betadine [Ethyl Chloride sprayed topically] : ethyl chloride sprayed topically [Sterile technique used] : sterile technique used [Orthovisc (30mg)] : 30mg of Orthovisc [#4] : series #4 [] : Patient tolerated procedure well [Call if redness, pain or fever occur] : call if redness, pain or fever occur [Apply ice for 15min out of every hour for the next 12-24 hours as tolerated] : apply ice for 15 minutes out of every hour for the next 12-24 hours as tolerated [Patient was advised to rest the joint(s) for ____ days] : patient was advised to rest the joint(s) for [unfilled] days [Previous OTC use and PT nontherapeutic] : patient has tried OTC's including aspirin, Ibuprofen, Aleve, etc or prescription NSAIDS, and/or exercises at home and/or physical therapy without satisfactory response [Patient had decreased mobility in the joint] : patient had decreased mobility in the joint [Risks, benefits, alternatives discussed / Verbal consent obtained] : the risks benefits, and alternatives have been discussed, and verbal consent was obtained

## 2023-08-07 ENCOUNTER — APPOINTMENT (OUTPATIENT)
Dept: ORTHOPEDIC SURGERY | Facility: CLINIC | Age: 73
End: 2023-08-07
Payer: MEDICARE

## 2023-08-07 VITALS — BODY MASS INDEX: 28.82 KG/M2 | HEIGHT: 65 IN | WEIGHT: 173 LBS

## 2023-08-07 DIAGNOSIS — M17.0 BILATERAL PRIMARY OSTEOARTHRITIS OF KNEE: ICD-10-CM

## 2023-08-07 PROCEDURE — 99213 OFFICE O/P EST LOW 20 MIN: CPT

## 2023-08-07 NOTE — HISTORY OF PRESENT ILLNESS
[Gradual] : gradual [Dull/Aching] : dull/aching [Localized] : localized [Throbbing] : throbbing [Sitting] : sitting [Stairs] : stairs [de-identified] : 5-25-23- for b/l knee orthovisc #2\par  \par  4-20-23- known b/l knee oa. did well with ha injections completed 5/3/2022. improved pain and ability to tolerate adls for greater than 6 months. recently pain has returned [] : no [FreeTextEntry1] : b/l knees  [FreeTextEntry5] : patient has been dealing with knee pain for years bit feels that pain and stiffness has gotten worse. left knee is worse.  [FreeTextEntry6] : weakness  [de-identified] : getting up from a seated position

## 2023-09-01 ENCOUNTER — APPOINTMENT (OUTPATIENT)
Dept: ORTHOPEDIC SURGERY | Facility: CLINIC | Age: 73
End: 2023-09-01
Payer: MEDICARE

## 2023-09-01 VITALS — BODY MASS INDEX: 28.82 KG/M2 | WEIGHT: 173 LBS | HEIGHT: 65 IN

## 2023-09-01 DIAGNOSIS — M17.12 UNILATERAL PRIMARY OSTEOARTHRITIS, LEFT KNEE: ICD-10-CM

## 2023-09-01 DIAGNOSIS — Z86.79 PERSONAL HISTORY OF OTHER DISEASES OF THE CIRCULATORY SYSTEM: ICD-10-CM

## 2023-09-01 PROCEDURE — 99215 OFFICE O/P EST HI 40 MIN: CPT

## 2023-09-01 NOTE — HISTORY OF PRESENT ILLNESS
[Left Leg] : left leg [Right Leg] : right leg [Gradual] : gradual [Localized] : localized [Intermittent] : intermittent [Household chores] : household chores [Rest] : rest [Injection therapy] : injection therapy [Standing] : standing [Walking] : walking [Stairs] : stairs [10] : 10 [0] : 0 [Sharp] : sharp [de-identified] : 09/01/23 here today with bl knees pain ,left knee pain is worse than right knee H/O left knee meniscus reapir pt has try visco injections in the past with good relief  pt feels unstable sometimes  [] : no [FreeTextEntry1] : knees  [de-identified] : Dr Braga  [de-identified] : x rays done at [facility]  [de-identified] : visco injections

## 2023-09-01 NOTE — PHYSICAL EXAM
[Left] : left knee [] : patient ambulates without assistive device [TWNoteComboBox7] : flexion 125 degrees [de-identified] : extension 0 degrees

## 2023-09-01 NOTE — ASSESSMENT
[FreeTextEntry1] : 73 year F WITH MODERATE B/L KNEE PAIN, L>R. HAS SEEN DR. CHAUDHRY IN THE PAST AND TRIED CSI AND GEL INJECTIONS WITH GOOD RELIEF. H/O LT KNEE SCOPE. C/O LT KNEE INSTABILITY AT TIMES. PAIN WORSENS WITH STAIRS AND WALKING PROLONGED DISTANCES. PAIN IS AFFECTING ADL AND FUNCTIONAL ACTIVITIES. LT KNEE 4/20/23 XRAYS REVIEWED WITH ADVANCED MEDIAL OA, RT KNEE XRAYS FROM 4/20/23 REVIEWED WITH MILD MEDIAL, PF OA. TREATMENT OPTIONS REVIEWED. QUESTIONS ANSWERED. LT TKA DISCUSSED AT LENGTH.   PMHx: HTN, HLD,  NO METAL ALLERGIES NO H/O DM NO H/O DVT/PE NO H/O MRSA/VRSA  LT TKA - DEPUY  The patient was advised of the diagnosis. The natural history of the pathology was explained in full to the patient in layman's terms. All questions were answered. The risks and benefits of surgical and non-surgical treatment alternatives were explained in full to the patient.   We discussed my findings and the natural history of their condition. We talked about the details of the proposed surgery and the recovery. We discussed the material risks, possible benefits and alternatives to surgery. The risks include but are not limited to infection, bleeding and possible need for blood transfusion, fracture, bowel blockage, bladder retention or infection, need for reoperation, stiffness and/or limited range of motion, possible damage to nerves and blood vessels, failure of fixation of components, risk of deep vein thromboses and pulmonary embolism, wound healing problems, dislocation, and possible leg length discrepancy. Although incredibly rare, we also discussed the risks of a cardiac event, stroke and even death during, or following, the surgery. We discussed the type of implants the patient will be receiving and the type of fixation that will be used, as well as whether a robot or computer navigation aide will be used. The patient understands they will need medical clearance and will attend a preoperative joint education class. We also discussed the type of anesthesia they will receive, and the risks associated with hospital or rehab length of stay, obesity, diabetes and smoking.  Patient Complains of pain in the affected joint with a level that often reaches greater than a 8/10. The pain has been progressively worsening throughout his/her treatment course. The pain has interfered with their ADLs and worsens with weight bearing. On exam they often have episodes of swelling/effusion with limited ROM. Pain worsens with ROM passive and active and I can palpate crepitus.   X- rays were reviewed with the patient and they show joint space narrowing, subchondral sclerosis, osteophyte formation, and subchondral cysts. After a period of more than 12 weeks physical therapy or exercise program done with me or another treating physician they have continued pain. The patient has failed a trial of NSAID medication or pain relievers if they were unable to tolerate NSAID medications as well as a series of injections, steroids, or hyaluronic acid. After a long discussion with the patient both the patient and I have decided we have exhausted all forms of less radical treatments and they would like to proceed with Total Joint Replacement.

## 2023-11-06 ENCOUNTER — RX RENEWAL (OUTPATIENT)
Age: 73
End: 2023-11-06

## 2023-11-15 ENCOUNTER — OFFICE (OUTPATIENT)
Dept: URBAN - METROPOLITAN AREA CLINIC 109 | Facility: CLINIC | Age: 73
Setting detail: OPHTHALMOLOGY
End: 2023-11-15
Payer: MEDICARE

## 2023-11-15 DIAGNOSIS — D31.31: ICD-10-CM

## 2023-11-15 DIAGNOSIS — H02.814: ICD-10-CM

## 2023-11-15 DIAGNOSIS — H02.811: ICD-10-CM

## 2023-11-15 DIAGNOSIS — H02.403: ICD-10-CM

## 2023-11-15 DIAGNOSIS — H02.812: ICD-10-CM

## 2023-11-15 DIAGNOSIS — H16.223: ICD-10-CM

## 2023-11-15 DIAGNOSIS — H43.393: ICD-10-CM

## 2023-11-15 DIAGNOSIS — H02.815: ICD-10-CM

## 2023-11-15 DIAGNOSIS — H40.013: ICD-10-CM

## 2023-11-15 PROCEDURE — 92014 COMPRE OPH EXAM EST PT 1/>: CPT | Performed by: OPHTHALMOLOGY

## 2023-11-15 PROCEDURE — 92133 CPTRZD OPH DX IMG PST SGM ON: CPT | Performed by: OPHTHALMOLOGY

## 2023-11-15 ASSESSMENT — CONFRONTATIONAL VISUAL FIELD TEST (CVF)
OS_FINDINGS: FULL
OD_FINDINGS: FULL

## 2023-11-15 ASSESSMENT — DECREASING TEAR LAKE - SEVERITY SCORE
OS_DEC_TEARLAKE: 2+
OD_DEC_TEARLAKE: 2+

## 2023-11-15 ASSESSMENT — LID EXAM ASSESSMENTS: OS_COMMENTS: 2 SMALL CYSTIC LESIONS

## 2023-11-15 ASSESSMENT — LID POSITION - PTOSIS
OD_PTOSIS: RUL 1+
OS_PTOSIS: LUL 1+

## 2023-11-15 ASSESSMENT — REFRACTION_AUTOREFRACTION
OS_CYLINDER: -1.00
OD_CYLINDER: -1.75
OD_SPHERE: +1.25
OS_AXIS: 32
OS_SPHERE: +1.25
OD_AXIS: 153

## 2023-11-15 ASSESSMENT — LID POSITION - DERMATOCHALASIS
OS_DERMATOCHALASIS: LUL 1+
OD_DERMATOCHALASIS: RUL 1+

## 2023-11-15 ASSESSMENT — SUPERFICIAL PUNCTATE KERATITIS (SPK)
OS_SPK: T
OD_SPK: T

## 2023-11-15 ASSESSMENT — SPHEQUIV_DERIVED
OS_SPHEQUIV: 0.75
OD_SPHEQUIV: 0.375

## 2023-11-28 ENCOUNTER — OUTPATIENT (OUTPATIENT)
Dept: OUTPATIENT SERVICES | Facility: HOSPITAL | Age: 73
LOS: 1 days | End: 2023-11-28
Payer: MEDICARE

## 2023-11-28 VITALS
HEIGHT: 64.3 IN | TEMPERATURE: 99 F | WEIGHT: 175.05 LBS | DIASTOLIC BLOOD PRESSURE: 60 MMHG | HEART RATE: 79 BPM | OXYGEN SATURATION: 96 % | SYSTOLIC BLOOD PRESSURE: 129 MMHG | RESPIRATION RATE: 15 BRPM

## 2023-11-28 DIAGNOSIS — Z90.89 ACQUIRED ABSENCE OF OTHER ORGANS: Chronic | ICD-10-CM

## 2023-11-28 DIAGNOSIS — M25.562 PAIN IN LEFT KNEE: ICD-10-CM

## 2023-11-28 DIAGNOSIS — Z90.710 ACQUIRED ABSENCE OF BOTH CERVIX AND UTERUS: Chronic | ICD-10-CM

## 2023-11-28 DIAGNOSIS — Z98.890 OTHER SPECIFIED POSTPROCEDURAL STATES: Chronic | ICD-10-CM

## 2023-11-28 DIAGNOSIS — M17.12 UNILATERAL PRIMARY OSTEOARTHRITIS, LEFT KNEE: ICD-10-CM

## 2023-11-28 DIAGNOSIS — Z01.818 ENCOUNTER FOR OTHER PREPROCEDURAL EXAMINATION: ICD-10-CM

## 2023-11-28 LAB
A1C WITH ESTIMATED AVERAGE GLUCOSE RESULT: 5.2 % — SIGNIFICANT CHANGE UP (ref 4–5.6)
A1C WITH ESTIMATED AVERAGE GLUCOSE RESULT: 5.2 % — SIGNIFICANT CHANGE UP (ref 4–5.6)
ALBUMIN SERPL ELPH-MCNC: 3.8 G/DL — SIGNIFICANT CHANGE UP (ref 3.3–5)
ALBUMIN SERPL ELPH-MCNC: 3.8 G/DL — SIGNIFICANT CHANGE UP (ref 3.3–5)
ALP SERPL-CCNC: 88 U/L — SIGNIFICANT CHANGE UP (ref 30–120)
ALP SERPL-CCNC: 88 U/L — SIGNIFICANT CHANGE UP (ref 30–120)
ALT FLD-CCNC: 32 U/L — SIGNIFICANT CHANGE UP (ref 10–60)
ALT FLD-CCNC: 32 U/L — SIGNIFICANT CHANGE UP (ref 10–60)
ANION GAP SERPL CALC-SCNC: 6 MMOL/L — SIGNIFICANT CHANGE UP (ref 5–17)
ANION GAP SERPL CALC-SCNC: 6 MMOL/L — SIGNIFICANT CHANGE UP (ref 5–17)
APTT BLD: 30.2 SEC — SIGNIFICANT CHANGE UP (ref 24.5–35.6)
APTT BLD: 30.2 SEC — SIGNIFICANT CHANGE UP (ref 24.5–35.6)
AST SERPL-CCNC: 24 U/L — SIGNIFICANT CHANGE UP (ref 10–40)
AST SERPL-CCNC: 24 U/L — SIGNIFICANT CHANGE UP (ref 10–40)
BILIRUB SERPL-MCNC: 0.6 MG/DL — SIGNIFICANT CHANGE UP (ref 0.2–1.2)
BILIRUB SERPL-MCNC: 0.6 MG/DL — SIGNIFICANT CHANGE UP (ref 0.2–1.2)
BUN SERPL-MCNC: 16 MG/DL — SIGNIFICANT CHANGE UP (ref 7–23)
BUN SERPL-MCNC: 16 MG/DL — SIGNIFICANT CHANGE UP (ref 7–23)
CALCIUM SERPL-MCNC: 9.8 MG/DL — SIGNIFICANT CHANGE UP (ref 8.4–10.5)
CALCIUM SERPL-MCNC: 9.8 MG/DL — SIGNIFICANT CHANGE UP (ref 8.4–10.5)
CHLORIDE SERPL-SCNC: 105 MMOL/L — SIGNIFICANT CHANGE UP (ref 96–108)
CHLORIDE SERPL-SCNC: 105 MMOL/L — SIGNIFICANT CHANGE UP (ref 96–108)
CO2 SERPL-SCNC: 30 MMOL/L — SIGNIFICANT CHANGE UP (ref 22–31)
CO2 SERPL-SCNC: 30 MMOL/L — SIGNIFICANT CHANGE UP (ref 22–31)
CREAT SERPL-MCNC: 0.76 MG/DL — SIGNIFICANT CHANGE UP (ref 0.5–1.3)
CREAT SERPL-MCNC: 0.76 MG/DL — SIGNIFICANT CHANGE UP (ref 0.5–1.3)
EGFR: 82 ML/MIN/1.73M2 — SIGNIFICANT CHANGE UP
EGFR: 82 ML/MIN/1.73M2 — SIGNIFICANT CHANGE UP
ESTIMATED AVERAGE GLUCOSE: 103 MG/DL — SIGNIFICANT CHANGE UP (ref 68–114)
ESTIMATED AVERAGE GLUCOSE: 103 MG/DL — SIGNIFICANT CHANGE UP (ref 68–114)
GLUCOSE SERPL-MCNC: 111 MG/DL — HIGH (ref 70–99)
GLUCOSE SERPL-MCNC: 111 MG/DL — HIGH (ref 70–99)
HCT VFR BLD CALC: 44 % — SIGNIFICANT CHANGE UP (ref 34.5–45)
HCT VFR BLD CALC: 44 % — SIGNIFICANT CHANGE UP (ref 34.5–45)
HGB BLD-MCNC: 14.5 G/DL — SIGNIFICANT CHANGE UP (ref 11.5–15.5)
HGB BLD-MCNC: 14.5 G/DL — SIGNIFICANT CHANGE UP (ref 11.5–15.5)
INR BLD: 0.94 RATIO — SIGNIFICANT CHANGE UP (ref 0.85–1.18)
INR BLD: 0.94 RATIO — SIGNIFICANT CHANGE UP (ref 0.85–1.18)
MCHC RBC-ENTMCNC: 29.5 PG — SIGNIFICANT CHANGE UP (ref 27–34)
MCHC RBC-ENTMCNC: 29.5 PG — SIGNIFICANT CHANGE UP (ref 27–34)
MCHC RBC-ENTMCNC: 33 GM/DL — SIGNIFICANT CHANGE UP (ref 32–36)
MCHC RBC-ENTMCNC: 33 GM/DL — SIGNIFICANT CHANGE UP (ref 32–36)
MCV RBC AUTO: 89.6 FL — SIGNIFICANT CHANGE UP (ref 80–100)
MCV RBC AUTO: 89.6 FL — SIGNIFICANT CHANGE UP (ref 80–100)
MRSA PCR RESULT.: SIGNIFICANT CHANGE UP
MRSA PCR RESULT.: SIGNIFICANT CHANGE UP
NRBC # BLD: 0 /100 WBCS — SIGNIFICANT CHANGE UP (ref 0–0)
NRBC # BLD: 0 /100 WBCS — SIGNIFICANT CHANGE UP (ref 0–0)
PLATELET # BLD AUTO: 239 K/UL — SIGNIFICANT CHANGE UP (ref 150–400)
PLATELET # BLD AUTO: 239 K/UL — SIGNIFICANT CHANGE UP (ref 150–400)
POTASSIUM SERPL-MCNC: 5.5 MMOL/L — HIGH (ref 3.5–5.3)
POTASSIUM SERPL-MCNC: 5.5 MMOL/L — HIGH (ref 3.5–5.3)
POTASSIUM SERPL-SCNC: 5.5 MMOL/L — HIGH (ref 3.5–5.3)
POTASSIUM SERPL-SCNC: 5.5 MMOL/L — HIGH (ref 3.5–5.3)
PROT SERPL-MCNC: 7.4 G/DL — SIGNIFICANT CHANGE UP (ref 6–8.3)
PROT SERPL-MCNC: 7.4 G/DL — SIGNIFICANT CHANGE UP (ref 6–8.3)
PROTHROM AB SERPL-ACNC: 10.3 SEC — SIGNIFICANT CHANGE UP (ref 9.5–13)
PROTHROM AB SERPL-ACNC: 10.3 SEC — SIGNIFICANT CHANGE UP (ref 9.5–13)
RBC # BLD: 4.91 M/UL — SIGNIFICANT CHANGE UP (ref 3.8–5.2)
RBC # BLD: 4.91 M/UL — SIGNIFICANT CHANGE UP (ref 3.8–5.2)
RBC # FLD: 12.5 % — SIGNIFICANT CHANGE UP (ref 10.3–14.5)
RBC # FLD: 12.5 % — SIGNIFICANT CHANGE UP (ref 10.3–14.5)
S AUREUS DNA NOSE QL NAA+PROBE: SIGNIFICANT CHANGE UP
S AUREUS DNA NOSE QL NAA+PROBE: SIGNIFICANT CHANGE UP
SODIUM SERPL-SCNC: 141 MMOL/L — SIGNIFICANT CHANGE UP (ref 135–145)
SODIUM SERPL-SCNC: 141 MMOL/L — SIGNIFICANT CHANGE UP (ref 135–145)
WBC # BLD: 6.77 K/UL — SIGNIFICANT CHANGE UP (ref 3.8–10.5)
WBC # BLD: 6.77 K/UL — SIGNIFICANT CHANGE UP (ref 3.8–10.5)
WBC # FLD AUTO: 6.77 K/UL — SIGNIFICANT CHANGE UP (ref 3.8–10.5)
WBC # FLD AUTO: 6.77 K/UL — SIGNIFICANT CHANGE UP (ref 3.8–10.5)

## 2023-11-28 PROCEDURE — 80053 COMPREHEN METABOLIC PANEL: CPT

## 2023-11-28 PROCEDURE — 86850 RBC ANTIBODY SCREEN: CPT

## 2023-11-28 PROCEDURE — 87641 MR-STAPH DNA AMP PROBE: CPT

## 2023-11-28 PROCEDURE — 36415 COLL VENOUS BLD VENIPUNCTURE: CPT

## 2023-11-28 PROCEDURE — 87640 STAPH A DNA AMP PROBE: CPT

## 2023-11-28 PROCEDURE — 85610 PROTHROMBIN TIME: CPT

## 2023-11-28 PROCEDURE — 86900 BLOOD TYPING SEROLOGIC ABO: CPT

## 2023-11-28 PROCEDURE — 85027 COMPLETE CBC AUTOMATED: CPT

## 2023-11-28 PROCEDURE — 85730 THROMBOPLASTIN TIME PARTIAL: CPT

## 2023-11-28 PROCEDURE — 83036 HEMOGLOBIN GLYCOSYLATED A1C: CPT

## 2023-11-28 PROCEDURE — 86901 BLOOD TYPING SEROLOGIC RH(D): CPT

## 2023-11-28 PROCEDURE — G0463: CPT

## 2023-11-28 RX ORDER — FAMOTIDINE 10 MG/ML
1 INJECTION INTRAVENOUS
Qty: 0 | Refills: 0 | DISCHARGE

## 2023-11-28 RX ORDER — FUROSEMIDE 40 MG
0.5 TABLET ORAL
Qty: 0 | Refills: 0 | DISCHARGE

## 2023-11-28 NOTE — H&P PST ADULT - NSICDXPASTMEDICALHX_GEN_ALL_CORE_FT
PAST MEDICAL HISTORY:  GERD (gastroesophageal reflux disease)     H/O hearing loss     H/O valvular heart disease     Hiatal hernia     HLD (hyperlipidemia)     HTN (hypertension)     Kidney stones     Osteoarthritis     Pulmonary HTN      PAST MEDICAL HISTORY:  Anxiety     Chronic neck pain     GERD (gastroesophageal reflux disease)     H/O hearing loss     H/O valvular heart disease     Hiatal hernia     History of posttraumatic stress disorder (PTSD)     HLD (hyperlipidemia)     HTN (hypertension)     Kidney stones     Lumbar stenosis     Osteoarthritis     Pulmonary HTN

## 2023-11-28 NOTE — H&P PST ADULT - NSICDXPASTSURGICALHX_GEN_ALL_CORE_FT
PAST SURGICAL HISTORY:  H/O lithotripsy     S/P hysterectomy     S/P left knee arthroscopy     S/P lumpectomy, left breast     S/P tonsillectomy

## 2023-11-28 NOTE — H&P PST ADULT - NSICDXFAMILYHX_GEN_ALL_CORE_FT
FAMILY HISTORY:  Father  Still living? No  Paternal family history of emphysema, Age at diagnosis: Age Unknown    Mother  Still living? No  Family history of Parkinson disease, Age at diagnosis: Age Unknown    Sibling  Still living? Yes, Estimated age: 71-80  Family history of breast cancer in sister, Age at diagnosis: Age Unknown    Child  Still living? Yes, Estimated age: 51-60  Family hx of melanoma, Age at diagnosis: Age Unknown  FH: prostate cancer, Age at diagnosis: Age Unknown

## 2023-11-28 NOTE — H&P PST ADULT - HISTORY OF PRESENT ILLNESS
this is a 74 y/o female who has had left knee pain and swelling for yrs and instability; has received gel and steroid shots which helped in the past; tests show bone on bone; to have left knee replacement this is a 72 y/o female who has had left knee pain and swelling for yrs and instability; has received gel and steroid shots which helped in the past; tests show bone on bone; to have left knee replacement

## 2023-11-28 NOTE — H&P PST ADULT - ASSESSMENT
this is a 74 y/o female who is scheduled for left knee replacement on 12/11/23 this is a 72 y/o female who is scheduled for left knee replacement on 12/11/23

## 2023-11-28 NOTE — H&P PST ADULT - PROBLEM SELECTOR PLAN 1
left total knee replacement; preop instructions given; to go for medical and cardiac clearance; ekg done by PCP

## 2023-11-30 LAB
BLD GP AB SCN SERPL QL: SIGNIFICANT CHANGE UP
BLD GP AB SCN SERPL QL: SIGNIFICANT CHANGE UP

## 2023-12-04 ENCOUNTER — APPOINTMENT (OUTPATIENT)
Dept: CARDIOLOGY | Facility: CLINIC | Age: 73
End: 2023-12-04
Payer: MEDICARE

## 2023-12-04 VITALS
OXYGEN SATURATION: 98 % | SYSTOLIC BLOOD PRESSURE: 136 MMHG | WEIGHT: 175 LBS | HEIGHT: 65 IN | DIASTOLIC BLOOD PRESSURE: 83 MMHG | BODY MASS INDEX: 29.16 KG/M2 | HEART RATE: 80 BPM

## 2023-12-04 VITALS — DIASTOLIC BLOOD PRESSURE: 80 MMHG | SYSTOLIC BLOOD PRESSURE: 122 MMHG

## 2023-12-04 DIAGNOSIS — Z01.810 ENCOUNTER FOR PREPROCEDURAL CARDIOVASCULAR EXAMINATION: ICD-10-CM

## 2023-12-04 DIAGNOSIS — I45.10 UNSPECIFIED RIGHT BUNDLE-BRANCH BLOCK: ICD-10-CM

## 2023-12-04 PROCEDURE — 93000 ELECTROCARDIOGRAM COMPLETE: CPT | Mod: NC

## 2023-12-04 PROCEDURE — 99214 OFFICE O/P EST MOD 30 MIN: CPT

## 2023-12-10 ENCOUNTER — TRANSCRIPTION ENCOUNTER (OUTPATIENT)
Age: 73
End: 2023-12-10

## 2023-12-10 ENCOUNTER — NON-APPOINTMENT (OUTPATIENT)
Age: 73
End: 2023-12-10

## 2023-12-11 ENCOUNTER — TRANSCRIPTION ENCOUNTER (OUTPATIENT)
Age: 73
End: 2023-12-11

## 2023-12-11 ENCOUNTER — APPOINTMENT (OUTPATIENT)
Dept: ORTHOPEDIC SURGERY | Facility: HOSPITAL | Age: 73
End: 2023-12-11
Payer: MEDICARE

## 2023-12-11 ENCOUNTER — INPATIENT (INPATIENT)
Facility: HOSPITAL | Age: 73
LOS: 0 days | Discharge: HOME | DRG: 470 | End: 2023-12-12
Attending: ORTHOPAEDIC SURGERY | Admitting: ORTHOPAEDIC SURGERY
Payer: COMMERCIAL

## 2023-12-11 VITALS
TEMPERATURE: 97 F | SYSTOLIC BLOOD PRESSURE: 137 MMHG | OXYGEN SATURATION: 99 % | RESPIRATION RATE: 19 BRPM | WEIGHT: 177.03 LBS | HEART RATE: 69 BPM | HEIGHT: 64 IN | DIASTOLIC BLOOD PRESSURE: 70 MMHG

## 2023-12-11 DIAGNOSIS — I10 ESSENTIAL (PRIMARY) HYPERTENSION: ICD-10-CM

## 2023-12-11 DIAGNOSIS — Z98.890 OTHER SPECIFIED POSTPROCEDURAL STATES: Chronic | ICD-10-CM

## 2023-12-11 DIAGNOSIS — Z90.89 ACQUIRED ABSENCE OF OTHER ORGANS: Chronic | ICD-10-CM

## 2023-12-11 DIAGNOSIS — M17.12 UNILATERAL PRIMARY OSTEOARTHRITIS, LEFT KNEE: ICD-10-CM

## 2023-12-11 DIAGNOSIS — Z29.9 ENCOUNTER FOR PROPHYLACTIC MEASURES, UNSPECIFIED: ICD-10-CM

## 2023-12-11 DIAGNOSIS — M19.90 UNSPECIFIED OSTEOARTHRITIS, UNSPECIFIED SITE: ICD-10-CM

## 2023-12-11 DIAGNOSIS — Z90.710 ACQUIRED ABSENCE OF BOTH CERVIX AND UTERUS: Chronic | ICD-10-CM

## 2023-12-11 PROCEDURE — 20985 CPTR-ASST DIR MS PX: CPT

## 2023-12-11 PROCEDURE — 27447 TOTAL KNEE ARTHROPLASTY: CPT | Mod: AS,LT

## 2023-12-11 PROCEDURE — 27447 TOTAL KNEE ARTHROPLASTY: CPT | Mod: LT

## 2023-12-11 PROCEDURE — 73560 X-RAY EXAM OF KNEE 1 OR 2: CPT | Mod: 26,LT

## 2023-12-11 DEVICE — CEMENT SIMPLEX P 40GM: Type: IMPLANTABLE DEVICE | Site: LEFT | Status: FUNCTIONAL

## 2023-12-11 DEVICE — PIN STERILE: Type: IMPLANTABLE DEVICE | Site: LEFT | Status: FUNCTIONAL

## 2023-12-11 DEVICE — IMPLANTABLE DEVICE: Type: IMPLANTABLE DEVICE | Site: LEFT | Status: FUNCTIONAL

## 2023-12-11 DEVICE — BASEPLATE ATTUNE FB TIB BASE SZ 4: Type: IMPLANTABLE DEVICE | Site: LEFT | Status: FUNCTIONAL

## 2023-12-11 DEVICE — PIN FIX AMK 1/8X3IN: Type: IMPLANTABLE DEVICE | Site: LEFT | Status: FUNCTIONAL

## 2023-12-11 DEVICE — ORTHOALIGN THREADED PIN HEADED: Type: IMPLANTABLE DEVICE | Site: LEFT | Status: FUNCTIONAL

## 2023-12-11 RX ORDER — APIXABAN 2.5 MG/1
2.5 TABLET, FILM COATED ORAL EVERY 12 HOURS
Refills: 0 | Status: DISCONTINUED | OUTPATIENT
Start: 2023-12-12 | End: 2023-12-12

## 2023-12-11 RX ORDER — HYDROMORPHONE HYDROCHLORIDE 2 MG/ML
0.25 INJECTION INTRAMUSCULAR; INTRAVENOUS; SUBCUTANEOUS
Refills: 0 | Status: DISCONTINUED | OUTPATIENT
Start: 2023-12-11 | End: 2023-12-11

## 2023-12-11 RX ORDER — ONDANSETRON 8 MG/1
4 TABLET, FILM COATED ORAL EVERY 6 HOURS
Refills: 0 | Status: DISCONTINUED | OUTPATIENT
Start: 2023-12-11 | End: 2023-12-12

## 2023-12-11 RX ORDER — SENNA PLUS 8.6 MG/1
2 TABLET ORAL
Qty: 0 | Refills: 0 | DISCHARGE
Start: 2023-12-11

## 2023-12-11 RX ORDER — ATORVASTATIN CALCIUM 80 MG/1
80 TABLET, FILM COATED ORAL AT BEDTIME
Refills: 0 | Status: DISCONTINUED | OUTPATIENT
Start: 2023-12-11 | End: 2023-12-12

## 2023-12-11 RX ORDER — ASPIRIN/CALCIUM CARB/MAGNESIUM 324 MG
1 TABLET ORAL
Qty: 28 | Refills: 0
Start: 2023-12-11 | End: 2023-12-24

## 2023-12-11 RX ORDER — CELECOXIB 200 MG/1
1 CAPSULE ORAL
Qty: 56 | Refills: 0
Start: 2023-12-11 | End: 2024-01-07

## 2023-12-11 RX ORDER — POLYETHYLENE GLYCOL 3350 17 G/17G
17 POWDER, FOR SOLUTION ORAL AT BEDTIME
Refills: 0 | Status: DISCONTINUED | OUTPATIENT
Start: 2023-12-11 | End: 2023-12-12

## 2023-12-11 RX ORDER — MAGNESIUM HYDROXIDE 400 MG/1
30 TABLET, CHEWABLE ORAL DAILY
Refills: 0 | Status: DISCONTINUED | OUTPATIENT
Start: 2023-12-11 | End: 2023-12-12

## 2023-12-11 RX ORDER — ROSUVASTATIN CALCIUM 5 MG/1
1 TABLET ORAL
Qty: 0 | Refills: 0 | DISCHARGE

## 2023-12-11 RX ORDER — SODIUM CHLORIDE 9 MG/ML
100 INJECTION, SOLUTION INTRAVENOUS
Refills: 0 | Status: DISCONTINUED | OUTPATIENT
Start: 2023-12-11 | End: 2023-12-12

## 2023-12-11 RX ORDER — DEXAMETHASONE 0.5 MG/5ML
8 ELIXIR ORAL ONCE
Refills: 0 | Status: COMPLETED | OUTPATIENT
Start: 2023-12-12 | End: 2023-12-12

## 2023-12-11 RX ORDER — ONDANSETRON 8 MG/1
4 TABLET, FILM COATED ORAL ONCE
Refills: 0 | Status: DISCONTINUED | OUTPATIENT
Start: 2023-12-11 | End: 2023-12-11

## 2023-12-11 RX ORDER — SERTRALINE 25 MG/1
50 TABLET, FILM COATED ORAL DAILY
Refills: 0 | Status: DISCONTINUED | OUTPATIENT
Start: 2023-12-11 | End: 2023-12-12

## 2023-12-11 RX ORDER — SODIUM CHLORIDE 9 MG/ML
1000 INJECTION, SOLUTION INTRAVENOUS
Refills: 0 | Status: DISCONTINUED | OUTPATIENT
Start: 2023-12-11 | End: 2023-12-11

## 2023-12-11 RX ORDER — LOSARTAN POTASSIUM 100 MG/1
50 TABLET, FILM COATED ORAL DAILY
Refills: 0 | Status: DISCONTINUED | OUTPATIENT
Start: 2023-12-13 | End: 2023-12-12

## 2023-12-11 RX ORDER — SERTRALINE 25 MG/1
1 TABLET, FILM COATED ORAL
Qty: 0 | Refills: 0 | DISCHARGE

## 2023-12-11 RX ORDER — PANTOPRAZOLE SODIUM 20 MG/1
40 TABLET, DELAYED RELEASE ORAL
Refills: 0 | Status: DISCONTINUED | OUTPATIENT
Start: 2023-12-11 | End: 2023-12-12

## 2023-12-11 RX ORDER — HYDROMORPHONE HYDROCHLORIDE 2 MG/ML
0.5 INJECTION INTRAMUSCULAR; INTRAVENOUS; SUBCUTANEOUS
Refills: 0 | Status: DISCONTINUED | OUTPATIENT
Start: 2023-12-11 | End: 2023-12-12

## 2023-12-11 RX ORDER — METOPROLOL TARTRATE 50 MG
50 TABLET ORAL DAILY
Refills: 0 | Status: DISCONTINUED | OUTPATIENT
Start: 2023-12-11 | End: 2023-12-12

## 2023-12-11 RX ORDER — HYDROMORPHONE HYDROCHLORIDE 2 MG/ML
0.5 INJECTION INTRAMUSCULAR; INTRAVENOUS; SUBCUTANEOUS
Refills: 0 | Status: DISCONTINUED | OUTPATIENT
Start: 2023-12-11 | End: 2023-12-11

## 2023-12-11 RX ORDER — POLYETHYLENE GLYCOL 3350 17 G/17G
17 POWDER, FOR SOLUTION ORAL
Qty: 0 | Refills: 0 | DISCHARGE
Start: 2023-12-11

## 2023-12-11 RX ORDER — CHLORHEXIDINE GLUCONATE 213 G/1000ML
1 SOLUTION TOPICAL ONCE
Refills: 0 | Status: COMPLETED | OUTPATIENT
Start: 2023-12-11 | End: 2023-12-11

## 2023-12-11 RX ORDER — OXYCODONE HYDROCHLORIDE 5 MG/1
5 TABLET ORAL
Refills: 0 | Status: DISCONTINUED | OUTPATIENT
Start: 2023-12-11 | End: 2023-12-12

## 2023-12-11 RX ORDER — ACETAMINOPHEN 500 MG
1000 TABLET ORAL ONCE
Refills: 0 | Status: COMPLETED | OUTPATIENT
Start: 2023-12-11 | End: 2023-12-11

## 2023-12-11 RX ORDER — SENNA PLUS 8.6 MG/1
2 TABLET ORAL AT BEDTIME
Refills: 0 | Status: DISCONTINUED | OUTPATIENT
Start: 2023-12-11 | End: 2023-12-12

## 2023-12-11 RX ORDER — CEFAZOLIN SODIUM 1 G
2000 VIAL (EA) INJECTION ONCE
Refills: 0 | Status: COMPLETED | OUTPATIENT
Start: 2023-12-11 | End: 2023-12-11

## 2023-12-11 RX ORDER — OXYCODONE HYDROCHLORIDE 5 MG/1
10 TABLET ORAL
Refills: 0 | Status: DISCONTINUED | OUTPATIENT
Start: 2023-12-11 | End: 2023-12-12

## 2023-12-11 RX ORDER — ACETAMINOPHEN 500 MG
1000 TABLET ORAL EVERY 8 HOURS
Refills: 0 | Status: DISCONTINUED | OUTPATIENT
Start: 2023-12-11 | End: 2023-12-12

## 2023-12-11 RX ORDER — TRANEXAMIC ACID 100 MG/ML
1000 INJECTION, SOLUTION INTRAVENOUS ONCE
Refills: 0 | Status: COMPLETED | OUTPATIENT
Start: 2023-12-11 | End: 2023-12-11

## 2023-12-11 RX ORDER — ACETAMINOPHEN 500 MG
2 TABLET ORAL
Qty: 0 | Refills: 0 | DISCHARGE
Start: 2023-12-11

## 2023-12-11 RX ORDER — CEFAZOLIN SODIUM 1 G
2000 VIAL (EA) INJECTION EVERY 8 HOURS
Refills: 0 | Status: COMPLETED | OUTPATIENT
Start: 2023-12-11 | End: 2023-12-12

## 2023-12-11 RX ORDER — METOPROLOL TARTRATE 50 MG
1 TABLET ORAL
Qty: 0 | Refills: 0 | DISCHARGE

## 2023-12-11 RX ORDER — CELECOXIB 200 MG/1
200 CAPSULE ORAL EVERY 12 HOURS
Refills: 0 | Status: DISCONTINUED | OUTPATIENT
Start: 2023-12-11 | End: 2023-12-12

## 2023-12-11 RX ORDER — APIXABAN 2.5 MG/1
1 TABLET, FILM COATED ORAL
Qty: 28 | Refills: 0
Start: 2023-12-11 | End: 2023-12-24

## 2023-12-11 RX ORDER — LANOLIN ALCOHOL/MO/W.PET/CERES
3 CREAM (GRAM) TOPICAL ONCE
Refills: 0 | Status: COMPLETED | OUTPATIENT
Start: 2023-12-11 | End: 2023-12-11

## 2023-12-11 RX ORDER — APREPITANT 80 MG/1
40 CAPSULE ORAL ONCE
Refills: 0 | Status: COMPLETED | OUTPATIENT
Start: 2023-12-11 | End: 2023-12-11

## 2023-12-11 RX ORDER — OMEPRAZOLE 10 MG/1
1 CAPSULE, DELAYED RELEASE ORAL
Qty: 28 | Refills: 0
Start: 2023-12-11 | End: 2024-01-07

## 2023-12-11 RX ORDER — OLMESARTAN MEDOXOMIL 5 MG/1
1 TABLET, FILM COATED ORAL
Qty: 0 | Refills: 0 | DISCHARGE

## 2023-12-11 RX ORDER — OMEPRAZOLE 10 MG/1
1 CAPSULE, DELAYED RELEASE ORAL
Refills: 0 | DISCHARGE

## 2023-12-11 RX ADMIN — SENNA PLUS 2 TABLET(S): 8.6 TABLET ORAL at 22:08

## 2023-12-11 RX ADMIN — SODIUM CHLORIDE 150 MILLILITER(S): 9 INJECTION, SOLUTION INTRAVENOUS at 15:51

## 2023-12-11 RX ADMIN — Medication 3 MILLIGRAM(S): at 22:06

## 2023-12-11 RX ADMIN — CELECOXIB 200 MILLIGRAM(S): 200 CAPSULE ORAL at 22:07

## 2023-12-11 RX ADMIN — Medication 1000 MILLIGRAM(S): at 22:07

## 2023-12-11 RX ADMIN — SODIUM CHLORIDE 150 MILLILITER(S): 9 INJECTION, SOLUTION INTRAVENOUS at 22:08

## 2023-12-11 RX ADMIN — ATORVASTATIN CALCIUM 80 MILLIGRAM(S): 80 TABLET, FILM COATED ORAL at 22:07

## 2023-12-11 RX ADMIN — Medication 400 MILLIGRAM(S): at 15:52

## 2023-12-11 RX ADMIN — APREPITANT 40 MILLIGRAM(S): 80 CAPSULE ORAL at 07:08

## 2023-12-11 RX ADMIN — Medication 1000 MILLIGRAM(S): at 15:55

## 2023-12-11 RX ADMIN — Medication 100 MILLIGRAM(S): at 16:27

## 2023-12-11 RX ADMIN — CHLORHEXIDINE GLUCONATE 1 APPLICATION(S): 213 SOLUTION TOPICAL at 07:08

## 2023-12-11 RX ADMIN — Medication 1000 MILLIGRAM(S): at 22:13

## 2023-12-11 RX ADMIN — SODIUM CHLORIDE 75 MILLILITER(S): 9 INJECTION, SOLUTION INTRAVENOUS at 12:02

## 2023-12-11 RX ADMIN — CELECOXIB 200 MILLIGRAM(S): 200 CAPSULE ORAL at 22:13

## 2023-12-11 NOTE — OCCUPATIONAL THERAPY INITIAL EVALUATION ADULT - LIVES WITH, PROFILE
Pt lives with her  in a private home, 1 step to enter, ~12 steps inside with a walk in shower. Pt was independent with ADLs, IADLs, functional mobility/transfers prior to admission without AD. Pt reports her daughter is also coming to stay with her upon discharge./spouse

## 2023-12-11 NOTE — CONSULT NOTE ADULT - SUBJECTIVE AND OBJECTIVE BOX
this is a 74 y/o female who has had left knee pain and swelling for yrs and instability; has received gel and steroid shots which helped in the past; tests show bone on bone; to have left knee replacementthis is a 74 y/o female who has had left knee pain and swelling for yrs and instability; has received gel and steroid shots which helped in the past; tests show bone on bone; to have left knee replacement    Allergies:  	No Known Allergies:   PAST MEDICAL HISTORY:  Anxiety     Chronic neck pain     GERD (gastroesophageal reflux disease)     H/O hearing loss     H/O valvular heart disease     Hiatal hernia     History of posttraumatic stress disorder (PTSD)     HLD (hyperlipidemia)     HTN (hypertension)     Kidney stones     Lumbar stenosis     Osteoarthritis     Pulmonary HTN.     PAST SURGICAL HISTORY:  H/O lithotripsy     S/P hysterectomy     S/P left knee arthroscopy     S/P lumpectomy, left breast     S/P tonsillectomy.     FAMILY HISTORY:    FAMILY HISTORY:  Father  Still living? No  Paternal family history of emphysema, Age at diagnosis: Age Unknown    Mother  Still living? No  Family history of Parkinson disease, Age at diagnosis: Age Unknown    Sibling  Still living? Yes, Estimated age: 71-80  Family history of breast cancer in sister, Age at diagnosis: Age Unknown    Child      Anesthesia History:  · Previous Reaction to Anesthesia	nausea/vomiting  · Family History of Anesthesia Reaction/Malignant Hyperthermia	No  · Latex Allergy	No    Social History:   Substance Use History:  · Substance Use	caffeine  · Caffeine Type	coffee  · Caffeine Amount/Frequency	1-2 cups/cans per day    Alcohol Use History:  · Have you ever consumed alcohol	never    Tobacco Usage:  · Tobacco Usage: Never smoker    Medications/Review:   Home Medications:   * Patient Currently Takes Medications as of 28-Nov-2023 07:52 documented in Structured Notes  · 	metoprolol succinate 50 mg oral tablet, extended release: Last Dose Taken:  , 1 tab(s) orally once a day  · 	omeprazole 20 mg oral delayed release capsule: 1 cap(s) orally once a day  · 	Zoloft 50 mg oral tablet: Last Dose Taken:  , 1 tab(s) orally once a day  · 	Benicar 20 mg oral tablet: Last Dose Taken:  , 1 tab(s) orally once a day  · 	rosuvastatin 20 mg oral tablet: Last Dose Taken:  , 1 tab(s) orally once a day    Review of Systems:   · General	negative  · Skin/Breast	negative  · Ophthalmologic	negative  · ENMT	negative  · Respiratory and Thorax	negative  · Cardiovascular	negative  · Gastrointestinal	negative  · Genitourinary	negative  · Musculoskeletal Symptoms	arthritis; joint pain; neck pain; back pain; knees  · Location of Back Pain	cervical spine; lumbar spine  · Neurological	negative  · Negative Psychiatric Symptoms	no suicidal ideation; no depression  · Psychiatric Symptoms	anxiety  · Hematology/Lymphatics	negative  · Endocrine	negative  · Allergic/Immunologic	negative    Physical Exam:  · Constitutional	no distress  · Eyes	PERRL  · ENMT	no gross abnormalities  · Respiratory	clear to auscultation bilaterally  · Cardiovascular	regular rate and rhythm  · Gastrointestinal	nontender; no organomegaly; no masses palpable  · Genitourinary Male	not applicable  · Neurological Comments	unremarkable  · Musculoskeletal	no calf tenderness; decreased ROM due to pain; joint swelling; left knee  · Psychiatric	normal affect; alert and oriented x3; normal behavior

## 2023-12-11 NOTE — PHYSICAL THERAPY INITIAL EVALUATION ADULT - TRANSFER TRAINING, PT EVAL
Pt will perform sit to stand transfer I within 1-2 days with RW to promote safety and reduce risk of falls.

## 2023-12-11 NOTE — PHYSICAL THERAPY INITIAL EVALUATION ADULT - GAIT TRAINING, PT EVAL
Pt will ambulate 150 ft with RW I within 1-2 days. Pt will ascend/descend 12 stairs I + HR + straight cane within 1-2 days for safe household stair negotiation

## 2023-12-11 NOTE — PATIENT PROFILE ADULT - NSPROPOAURINARYCATHETER_GEN_A_NUR
Patient has c/o blurred vision and a headache to the back of her head. She also c/o palpitations. These symptoms started yesterday when she was a t work. Patient is 24 weeks pregnant.
no

## 2023-12-11 NOTE — DISCHARGE NOTE PROVIDER - CARE PROVIDER_API CALL
Bayron Slade [place]  Orthopaedic Surgery  [address] [place] [address]  Phone: [phone]  Fax: [phone]  Follow Up Time:    Bayron Slade [place]  Orthopaedic Surgery  [address] [place] [address]  Phone: [phone]  Fax: [phone]  Follow Up Time:    Bayron Slade [place]  Orthopaedic Surgery  [address] [place] [address]  Phone: [phone]  Fax: [phone]  Follow Up Time:     Shelton Tucker  Internal Medicine  [address] [place] [id number]  Phone: [phone]  Fax: [phone]  Follow Up Time:    Bayron Slade [place]  Orthopaedic Surgery  [address] [place] [address]  Phone: [phone]  Fax: [phone]  Follow Up Time:     Shelton Tucker  Internal Medicine  [address] [place] [address]  Phone: [phone]  Fax: [phone]  Follow Up Time:

## 2023-12-11 NOTE — PATIENT PROFILE ADULT - FALL HARM RISK - UNIVERSAL INTERVENTIONS
Bed in lowest position, wheels locked, appropriate side rails in place/Call bell, personal items and telephone in reach/Instruct patient to call for assistance before getting out of bed or chair/Non-slip footwear when patient is out of bed/Ethel to call system/Physically safe environment - no spills, clutter or unnecessary equipment/Purposeful Proactive Rounding/Room/bathroom lighting operational, light cord in reach Bed in lowest position, wheels locked, appropriate side rails in place/Call bell, personal items and telephone in reach/Instruct patient to call for assistance before getting out of bed or chair/Non-slip footwear when patient is out of bed/[place] to call system/Physically safe environment - no spills, clutter or unnecessary equipment/Purposeful Proactive Rounding/Room/bathroom lighting operational, light cord in reach

## 2023-12-11 NOTE — PHYSICAL THERAPY INITIAL EVALUATION ADULT - ADDITIONAL COMMENTS
Pt lives in a private home with her . Pt has 1 star entering home and 11-13 within. Pt has a walk in shower. Pt reports only using a cane when negotiating stairs. Pt lives in a private home with her . Pt has 1 star entering home and 11-13 within. Pt has a walk in shower. PTA pt was independent with ADLs and ambulation, pt reports only using a cane when negotiating stairs.

## 2023-12-11 NOTE — PROGRESS NOTE ADULT - SUBJECTIVE AND OBJECTIVE BOX
Ortho PA - Post Op Check - S/P - Left Total Knee Replacement      Pt alert and comfortable with no complaints, pain controlled  Denies nausea     Vital Signs Last 24 Hrs  T(C): 36.8 (12-11-23 @ 13:30), Max: 36.8 (12-11-23 @ 13:30)  T(F): 98.2 (12-11-23 @ 13:30), Max: 98.2 (12-11-23 @ 13:30)  HR: 64 (12-11-23 @ 13:30) (55 - 66)  BP: 123/51 (12-11-23 @ 13:30) (101/45 - 127/65)  BP(mean): --  RR: 16 (12-11-23 @ 13:30) (12 - 18)  SpO2: 98% (12-11-23 @ 13:30) (96% - 100%)  I&O's Detail    11 Dec 2023 07:01  -  11 Dec 2023 14:14  --------------------------------------------------------  IN:    Lactated Ringers: 1000 mL    Oral Fluid: 200 mL  Total IN: 1200 mL    OUT:    Blood Loss (mL): 200 mL    Incontinent per Collection Bag (mL): 500 mL    Voided (mL): 150 mL  Total OUT: 850 mL    Total NET: 350 mL        I&O's Summary    11 Dec 2023 07:01  -  11 Dec 2023 14:14  --------------------------------------------------------  IN: 1200 mL / OUT: 850 mL / NET: 350 mL                     MEDICATIONS:acetaminophen     Tablet .. 1000 milliGRAM(s) Oral every 8 hours  acetaminophen   IVPB .. 1000 milliGRAM(s) IV Intermittent once  aluminum hydroxide/magnesium hydroxide/simethicone Suspension 30 milliLiter(s) Oral four times a day PRN  atorvastatin 80 milliGRAM(s) Oral at bedtime  ceFAZolin   IVPB 2000 milliGRAM(s) IV Intermittent every 8 hours  celecoxib 200 milliGRAM(s) Oral every 12 hours  HYDROmorphone  Injectable 0.5 milliGRAM(s) IV Push every 3 hours PRN  lactated ringers. 100 milliLiter(s) IV Continuous <Continuous>  magnesium hydroxide Suspension 30 milliLiter(s) Oral daily PRN  metoprolol succinate ER 50 milliGRAM(s) Oral daily  ondansetron Injectable 4 milliGRAM(s) IV Push every 6 hours PRN  oxyCODONE    IR 5 milliGRAM(s) Oral every 3 hours PRN  oxyCODONE    IR 10 milliGRAM(s) Oral every 3 hours PRN  pantoprazole    Tablet 40 milliGRAM(s) Oral before breakfast  polyethylene glycol 3350 17 Gram(s) Oral at bedtime  senna 2 Tablet(s) Oral at bedtime  sertraline 50 milliGRAM(s) Oral daily    Anticoagulation:      Antibiotics:   ceFAZolin   IVPB 2000 milliGRAM(s) IV Intermittent every 8 hours      Pain medications:   acetaminophen     Tablet .. 1000 milliGRAM(s) Oral every 8 hours  acetaminophen   IVPB .. 1000 milliGRAM(s) IV Intermittent once  celecoxib 200 milliGRAM(s) Oral every 12 hours  HYDROmorphone  Injectable 0.5 milliGRAM(s) IV Push every 3 hours PRN  ondansetron Injectable 4 milliGRAM(s) IV Push every 6 hours PRN  oxyCODONE    IR 5 milliGRAM(s) Oral every 3 hours PRN  oxyCODONE    IR 10 milliGRAM(s) Oral every 3 hours PRN  sertraline 50 milliGRAM(s) Oral daily          PE:  Knee-primary surgical bandage dry and intact. Feet mobile and sensate.  EHLs/ant.tibs. 5/5  PAS on LE's.  Calves soft and nontender.    A/P: Ortho stable post-op  - Continue post-op orders; pain management with Celebrex, Oxycodone, Dilaudid, Acetaminphen  - Check labs in A.M.  - DVT prevention with Eliquis 2.5mg q 12 h  - PT /OT for OOB, full WBAT  - Medical consult  -Discharge planning  -Will continue to monitor closely with attendings.

## 2023-12-11 NOTE — DISCHARGE NOTE PROVIDER - NSDCCPCAREPLAN_GEN_ALL_CORE_FT
PRINCIPAL DISCHARGE DIAGNOSIS  Diagnosis: Primary localized osteoarthritis of left knee  Assessment and Plan of Treatment: Physical Therapy/Occupational Therapy for: ambulation, transfers, stairs, ADL's (activities of daily living), and range of motion exercises  -Activity  • Weight Bearing as tolerated with rolling walker.  • Take short, frequent walks increasing the distance that you walk each day as tolerated.  • Change your position every hour to decrease pain and stiffness.  • Continue the exercises taught to you by your physical therapist.  • No driving until cleared by the doctor.  • No tub baths, hot tubs, or swimming pools until instructed by your doctor.  • Do not squat down on the floor.  • Do not kneel or twist your knee.  • Range of Motion Goals: Flexion= 120 degrees, Extension = 0 degrees  Keep incision clean. DO NOT APPLY ANYTHING to incision site (salves/ointments/creams). Do not scrub incision site. Pat dry after shower.  Follow up 2 weeks after surgery at Surgeon's office.  Apply cryocuff to operative knee for 20 minutes at a time, several times per day, with at least 1 hour break in between sessions.  Follow up with your primary care physician within 2-3 weeks of discharge home   You have a RAOUL dressing. You may shower. Disconnect RAOUL battery prior to showering. Reconnect battery after showering and press orange button to resume RAOUL power. Remove RAOUL dressing on post-op day #7.  Keep incision clean. DO NOT APPLY ANYTHING to incision site (salves/ointments/creams). Do not scrub incision site. Pat dry after shower.

## 2023-12-11 NOTE — DISCHARGE NOTE PROVIDER - HOSPITAL COURSE
This patient is a 73 y.o. female with severe osteoarthritis of the left knee.  After admission on 12/11/23 and receiving pre-operative parenteral prophylactic antibiotics, the patient underwent an uncomplicated Left Total Knee Replacement by Dr. Slade.    A medical consultation from the Hospitalist service was obtained for post-operative medical co-management.   Typical Physical & occupational therapy modalities post Left Total Knee Replacement were performed including ambulation training, range of motion, ADL's, and transfers.  Eliquis 2.5mg q 12 h was given for DVT prophylaxis.  The patient had a clean appearing surgical incision with no sign of surgical site infections and had a stable neuro / vascular exam of the operated extremity.     Discharge and Orthopedic Care instructions were delineated in the Discharge Plan and reviewed with the patient.   All medications were delineated in the medication reconciliation tool and key points were reviewed with the patient.   The patient was deemed stable from an Orthopedic & medical standpoint for discharge today.  She will follow up with Dr. Slade for further orthopedic care upon completion of Home care PT.  Patient is going home with home care (PT/OT/Skilled Nursing)

## 2023-12-11 NOTE — OCCUPATIONAL THERAPY INITIAL EVALUATION ADULT - NSOTDISCHREC_GEN_A_CORE
Supervision/assist with functional activities prn which pt states  and daughter will provide./No skilled OT needs

## 2023-12-11 NOTE — DISCHARGE NOTE PROVIDER - PROVIDER TOKENS
PROVIDER:[TOKEN:[[id number]]] PROVIDER:[TOKEN:[[id number]]] PROVIDER:[TOKEN:[[id number]]],PROVIDER:[TOKEN:[[id number]]]

## 2023-12-11 NOTE — DISCHARGE NOTE PROVIDER - NSDCCPTREATMENT_GEN_ALL_CORE_FT
PRINCIPAL PROCEDURE  Procedure: Left total knee arthroplasty  Findings and Treatment: ALEXSANDRA

## 2023-12-11 NOTE — BRIEF OPERATIVE NOTE - NSICDXBRIEFPOSTOP_GEN_ALL_CORE_FT
POST-OP DIAGNOSIS:  Primary localized osteoarthritis of left knee 11-Dec-2023 11:14:34  Sebastian Phelan

## 2023-12-11 NOTE — DISCHARGE NOTE PROVIDER - NSDCFUSCHEDAPPT_GEN_ALL_CORE_FT
Bayron Slade  [facility] Physician [facility]  [address] [address] [place] [address]  Scheduled Appointment: 12/22/2023     Bayron Slade  [facility] Physician [facility]  [address] [place] [address]  Scheduled Appointment: 12/22/2023

## 2023-12-11 NOTE — DISCHARGE NOTE PROVIDER - NSDCMRMEDTOKEN_GEN_ALL_CORE_FT
Benicar 20 mg oral tablet: 1 tab(s) orally once a day  metoprolol succinate 50 mg oral tablet, extended release: 1 tab(s) orally once a day  omeprazole 20 mg oral delayed release capsule: 1 cap(s) orally once a day  rosuvastatin 20 mg oral tablet: 1 tab(s) orally once a day  Zoloft 50 mg oral tablet: 1 tab(s) orally once a day   Aspirin Enteric Coated 81 mg oral delayed release tablet: 1 tab(s) orally every 12 hours Take once 14 day course of Eliquis is completed. Take 2 hours before celebrex  Benicar 20 mg oral tablet: 1 tab(s) orally once a day  CeleBREX 200 mg oral capsule: 1 cap(s) orally every 12 hours Take 2 hours after Aspirin  Eliquis 2.5 mg oral tablet: 1 tab(s) orally every 12 hours Take until completed, then begin Aspirin 81 mg every 12 hours for 14 days  metoprolol succinate 50 mg oral tablet, extended release: 1 tab(s) orally once a day  omeprazole 20 mg oral delayed release capsule: 1 cap(s) orally once a day  oxyCODONE 5 mg oral tablet: 1 tab(s) orally every 4 hours as needed for  severe pain make take 2 tabs for severe pain if needed MDD: 6  polyethylene glycol 3350 oral powder for reconstitution: 17 gram(s) orally once a day (at bedtime)  rosuvastatin 20 mg oral tablet: 1 tab(s) orally once a day  senna leaf extract oral tablet: 2 tab(s) orally once a day (at bedtime)  Tylenol Extra Strength 500 mg oral tablet: 2 tab(s) orally every 8 hours  Zoloft 50 mg oral tablet: 1 tab(s) orally once a day

## 2023-12-11 NOTE — BRIEF OPERATIVE NOTE - NSICDXBRIEFPREOP_GEN_ALL_CORE_FT
PRE-OP DIAGNOSIS:  Primary localized osteoarthritis of left knee 11-Dec-2023 11:14:36  Sebastian Phelan   PRE-OP DIAGNOSIS:  Primary localized osteoarthritis of left knee 11-Dec-2023 11:14:36  Sebastian Pehlan

## 2023-12-12 ENCOUNTER — TRANSCRIPTION ENCOUNTER (OUTPATIENT)
Age: 73
End: 2023-12-12

## 2023-12-12 VITALS
SYSTOLIC BLOOD PRESSURE: 121 MMHG | RESPIRATION RATE: 18 BRPM | TEMPERATURE: 98 F | OXYGEN SATURATION: 95 % | HEART RATE: 68 BPM | DIASTOLIC BLOOD PRESSURE: 61 MMHG

## 2023-12-12 DIAGNOSIS — D72.829 ELEVATED WHITE BLOOD CELL COUNT, UNSPECIFIED: ICD-10-CM

## 2023-12-12 LAB
ANION GAP SERPL CALC-SCNC: 7 MMOL/L — SIGNIFICANT CHANGE UP (ref 5–17)
ANION GAP SERPL CALC-SCNC: 7 MMOL/L — SIGNIFICANT CHANGE UP (ref 5–17)
BUN SERPL-MCNC: 16 MG/DL — SIGNIFICANT CHANGE UP (ref 7–23)
BUN SERPL-MCNC: 16 MG/DL — SIGNIFICANT CHANGE UP (ref 7–23)
CALCIUM SERPL-MCNC: 9.2 MG/DL — SIGNIFICANT CHANGE UP (ref 8.4–10.5)
CALCIUM SERPL-MCNC: 9.2 MG/DL — SIGNIFICANT CHANGE UP (ref 8.4–10.5)
CHLORIDE SERPL-SCNC: 108 MMOL/L — SIGNIFICANT CHANGE UP (ref 96–108)
CHLORIDE SERPL-SCNC: 108 MMOL/L — SIGNIFICANT CHANGE UP (ref 96–108)
CO2 SERPL-SCNC: 26 MMOL/L — SIGNIFICANT CHANGE UP (ref 22–31)
CO2 SERPL-SCNC: 26 MMOL/L — SIGNIFICANT CHANGE UP (ref 22–31)
CREAT SERPL-MCNC: 0.75 MG/DL — SIGNIFICANT CHANGE UP (ref 0.5–1.3)
CREAT SERPL-MCNC: 0.75 MG/DL — SIGNIFICANT CHANGE UP (ref 0.5–1.3)
EGFR: 83 ML/MIN/1.73M2 — SIGNIFICANT CHANGE UP
EGFR: 83 ML/MIN/1.73M2 — SIGNIFICANT CHANGE UP
GLUCOSE SERPL-MCNC: 242 MG/DL — HIGH (ref 70–99)
GLUCOSE SERPL-MCNC: 242 MG/DL — HIGH (ref 70–99)
HCT VFR BLD CALC: 32.2 % — LOW (ref 34.5–45)
HCT VFR BLD CALC: 32.2 % — LOW (ref 34.5–45)
HCT VFR BLD CALC: 34.8 % — SIGNIFICANT CHANGE UP (ref 34.5–45)
HCT VFR BLD CALC: 34.8 % — SIGNIFICANT CHANGE UP (ref 34.5–45)
HGB BLD-MCNC: 10.6 G/DL — LOW (ref 11.5–15.5)
HGB BLD-MCNC: 10.6 G/DL — LOW (ref 11.5–15.5)
HGB BLD-MCNC: 11.4 G/DL — LOW (ref 11.5–15.5)
HGB BLD-MCNC: 11.4 G/DL — LOW (ref 11.5–15.5)
MCHC RBC-ENTMCNC: 29.3 PG — SIGNIFICANT CHANGE UP (ref 27–34)
MCHC RBC-ENTMCNC: 29.3 PG — SIGNIFICANT CHANGE UP (ref 27–34)
MCHC RBC-ENTMCNC: 32.9 GM/DL — SIGNIFICANT CHANGE UP (ref 32–36)
MCHC RBC-ENTMCNC: 32.9 GM/DL — SIGNIFICANT CHANGE UP (ref 32–36)
MCV RBC AUTO: 89 FL — SIGNIFICANT CHANGE UP (ref 80–100)
MCV RBC AUTO: 89 FL — SIGNIFICANT CHANGE UP (ref 80–100)
NRBC # BLD: 0 /100 WBCS — SIGNIFICANT CHANGE UP (ref 0–0)
NRBC # BLD: 0 /100 WBCS — SIGNIFICANT CHANGE UP (ref 0–0)
PLATELET # BLD AUTO: 209 K/UL — SIGNIFICANT CHANGE UP (ref 150–400)
PLATELET # BLD AUTO: 209 K/UL — SIGNIFICANT CHANGE UP (ref 150–400)
POTASSIUM SERPL-MCNC: 4 MMOL/L — SIGNIFICANT CHANGE UP (ref 3.5–5.3)
POTASSIUM SERPL-MCNC: 4 MMOL/L — SIGNIFICANT CHANGE UP (ref 3.5–5.3)
POTASSIUM SERPL-SCNC: 4 MMOL/L — SIGNIFICANT CHANGE UP (ref 3.5–5.3)
POTASSIUM SERPL-SCNC: 4 MMOL/L — SIGNIFICANT CHANGE UP (ref 3.5–5.3)
RBC # BLD: 3.62 M/UL — LOW (ref 3.8–5.2)
RBC # BLD: 3.62 M/UL — LOW (ref 3.8–5.2)
RBC # FLD: 12.3 % — SIGNIFICANT CHANGE UP (ref 10.3–14.5)
RBC # FLD: 12.3 % — SIGNIFICANT CHANGE UP (ref 10.3–14.5)
SODIUM SERPL-SCNC: 141 MMOL/L — SIGNIFICANT CHANGE UP (ref 135–145)
SODIUM SERPL-SCNC: 141 MMOL/L — SIGNIFICANT CHANGE UP (ref 135–145)
WBC # BLD: 15.11 K/UL — HIGH (ref 3.8–10.5)
WBC # BLD: 15.11 K/UL — HIGH (ref 3.8–10.5)
WBC # FLD AUTO: 15.11 K/UL — HIGH (ref 3.8–10.5)
WBC # FLD AUTO: 15.11 K/UL — HIGH (ref 3.8–10.5)

## 2023-12-12 PROCEDURE — 20985 CPTR-ASST DIR MS PX: CPT

## 2023-12-12 PROCEDURE — 36415 COLL VENOUS BLD VENIPUNCTURE: CPT

## 2023-12-12 PROCEDURE — 97165 OT EVAL LOW COMPLEX 30 MIN: CPT

## 2023-12-12 PROCEDURE — 85027 COMPLETE CBC AUTOMATED: CPT

## 2023-12-12 PROCEDURE — C1713: CPT

## 2023-12-12 PROCEDURE — 85014 HEMATOCRIT: CPT

## 2023-12-12 PROCEDURE — 27447 TOTAL KNEE ARTHROPLASTY: CPT

## 2023-12-12 PROCEDURE — C1776: CPT

## 2023-12-12 PROCEDURE — 97530 THERAPEUTIC ACTIVITIES: CPT

## 2023-12-12 PROCEDURE — 97116 GAIT TRAINING THERAPY: CPT

## 2023-12-12 PROCEDURE — 97535 SELF CARE MNGMENT TRAINING: CPT

## 2023-12-12 PROCEDURE — 97110 THERAPEUTIC EXERCISES: CPT

## 2023-12-12 PROCEDURE — 97161 PT EVAL LOW COMPLEX 20 MIN: CPT

## 2023-12-12 PROCEDURE — 85018 HEMOGLOBIN: CPT

## 2023-12-12 PROCEDURE — 73560 X-RAY EXAM OF KNEE 1 OR 2: CPT

## 2023-12-12 PROCEDURE — 80048 BASIC METABOLIC PNL TOTAL CA: CPT

## 2023-12-12 RX ORDER — OXYCODONE HYDROCHLORIDE 5 MG/1
1 TABLET ORAL
Qty: 20 | Refills: 0
Start: 2023-12-12

## 2023-12-12 RX ADMIN — Medication 1000 MILLIGRAM(S): at 05:26

## 2023-12-12 RX ADMIN — Medication 50 MILLIGRAM(S): at 05:26

## 2023-12-12 RX ADMIN — SERTRALINE 50 MILLIGRAM(S): 25 TABLET, FILM COATED ORAL at 08:54

## 2023-12-12 RX ADMIN — CELECOXIB 200 MILLIGRAM(S): 200 CAPSULE ORAL at 10:48

## 2023-12-12 RX ADMIN — APIXABAN 2.5 MILLIGRAM(S): 2.5 TABLET, FILM COATED ORAL at 08:52

## 2023-12-12 RX ADMIN — PANTOPRAZOLE SODIUM 40 MILLIGRAM(S): 20 TABLET, DELAYED RELEASE ORAL at 05:27

## 2023-12-12 RX ADMIN — SODIUM CHLORIDE 150 MILLILITER(S): 9 INJECTION, SOLUTION INTRAVENOUS at 08:54

## 2023-12-12 RX ADMIN — Medication 101.6 MILLIGRAM(S): at 05:27

## 2023-12-12 RX ADMIN — Medication 1000 MILLIGRAM(S): at 13:22

## 2023-12-12 RX ADMIN — CELECOXIB 200 MILLIGRAM(S): 200 CAPSULE ORAL at 08:52

## 2023-12-12 RX ADMIN — Medication 1000 MILLIGRAM(S): at 05:31

## 2023-12-12 RX ADMIN — Medication 100 MILLIGRAM(S): at 00:46

## 2023-12-12 NOTE — DISCHARGE NOTE NURSING/CASE MANAGEMENT/SOCIAL WORK - NSDCDMENAME_GEN_ALL_CORE_FT
[facility] [phone] rolling walker and commode  [facility] [phone] rolling walker and commode

## 2023-12-12 NOTE — CARE COORDINATION ASSESSMENT. - NSCAREPROVIDERS_GEN_ALL_CORE_FT
CARE PROVIDERS:  Administration: Letty Pan  Admitting: Bayron Slade  Attending: Bayron Slade  Case Management: Santaromana, Anna  Consultant: Fidel Morris  Covering Team: Rimma Hall  Infection Control: Laura Carpio  Nurse: Guadalupe Branch  Nurse: Florinda Shafer  Nurse: Adam, Merline  Occupational Therapy: Sarah Hatfield  Ordered: Reji Marroquin  Outpatient Provider: Shelton Tucker  Outpatient Provider: Pedro Landin  PCA/Nursing Assistant: Nader Rob  PCA/Nursing Assistant: Pedrito Liu  PCA/Nursing Assistant: Елена Sheffield  Physical Therapy: Joanne Engle  Physical Therapy: Quinton Parra  Primary Team: Dasha Manning  Primary Team: Sebastian Phelan  Primary Team: Manuel Piper  Primary Team: Jony Sun  Primary Team: Gray Esquivel  Primary Team: Chriss Fisher  Primary Team: Alden Phillips  Quality Review: Socorro Ferguson  Registered Dietitian: Quin Cortés// Supp. Assoc.: Fabiola Yang   CARE PROVIDERS:  Administration: Letty Pan  Admitting: Bayron Slade  Attending: Bayron Slade  Case Management: Santaromana, Anna  Consultant: Fidel Morris  Covering Team: Rimma Hall  Infection Control: Laura Carpio  Nurse: Guadalupe Branch  Nurse: Florinda Shafer  Nurse: Adam, Merline  Occupational Therapy: Sarah Hatfield  Ordered: Reji Marroquin  Outpatient Provider: Shelton Tucker  Outpatient Provider: Pedro Lnadin  PCA/Nursing Assistant: Nader Rob  PCA/Nursing Assistant: Pedrito Liu  PCA/Nursing Assistant: Елена Sheffield  Physical Therapy: Joanne Engle  Physical Therapy: Quinton Parra  Primary Team: Dasha Manning  Primary Team: Sebastian Phelan  Primary Team: Manuel Piper  Primary Team: Jony Sun  Primary Team: Gray Esquivel  Primary Team: Chriss Fisher  Primary Team: Alden Phillips  Quality Review: Socorro Ferguson  Registered Dietitian: Quin Cortés// Supp. Assoc.: Fabiola Yang

## 2023-12-12 NOTE — PROGRESS NOTE ADULT - SUBJECTIVE AND OBJECTIVE BOX
THUY SHORE  [id number]    Pt is a 73y year old Female s/p left TKR. pain is 2/10. (+) Voids, tolerating regular diet. Denies chest pain/shortness of breath/nausea/vomitting.     Vital Signs Last 24 Hrs  T(C): 36.6 (12 Dec 2023 03:30), Max: 37 (11 Dec 2023 15:30)  T(F): 97.9 (12 Dec 2023 03:30), Max: 98.6 (11 Dec 2023 15:30)  HR: 67 (12 Dec 2023 03:30) (55 - 74)  BP: 125/69 (12 Dec 2023 03:30) (101/45 - 127/65)  BP(mean): --  RR: 16 (12 Dec 2023 03:30) (12 - 18)  SpO2: 95% (12 Dec 2023 03:30) (95% - 100%)    Parameters below as of 12 Dec 2023 03:30  Patient On (Oxygen Delivery Method): room air        I&O's Detail    11 Dec 2023 07:01  -  12 Dec 2023 07:00  --------------------------------------------------------  IN:    Lactated Ringers: 1000 mL    Lactated Ringers: 1375 mL    Oral Fluid: 200 mL  Total IN: 2575 mL    OUT:    Blood Loss (mL): 200 mL    Incontinent per Collection Bag (mL): 500 mL    Voided (mL): 500 mL  Total OUT: 1200 mL    Total NET: 1375 mL                      PE:   LLE: Dressing changed, incision clean and dry, no erythema or drainage, RAOUL intact SILT distally, (+2) DP/PT pulses, EHL/FHL/TA intact, Capillary refill < 2 seconds. negative calf tenderness PAS on,    A: 73y year old Female s/p left TKR POD#1    Plan:   -DVT ppx = apixaban 2.5 milliGRAM(s) Oral every 12 hours    -PT/OT = OOB  -Pain control   -Medicine to follow   -continue to follow Labs  -continue use of PAS  -Dispo: home care THUY SHORE  [id number]    Pt is a 73y year old Female s/p left TKR. pain is 2/10. (+) Voids, tolerating regular diet. Denies chest pain/shortness of breath/nausea/vomitting.     Vital Signs Last 24 Hrs  T(C): 36.6 (12 Dec 2023 03:30), Max: 37 (11 Dec 2023 15:30)  T(F): 97.9 (12 Dec 2023 03:30), Max: 98.6 (11 Dec 2023 15:30)  HR: 67 (12 Dec 2023 03:30) (55 - 74)  BP: 125/69 (12 Dec 2023 03:30) (101/45 - 127/65)  BP(mean): --  RR: 16 (12 Dec 2023 03:30) (12 - 18)  SpO2: 95% (12 Dec 2023 03:30) (95% - 100%)    Parameters below as of 12 Dec 2023 03:30  Patient On (Oxygen Delivery Method): room air        I&O's Detail    11 Dec 2023 07:01  -  12 Dec 2023 07:00  --------------------------------------------------------  IN:    Lactated Ringers: 1000 mL    Lactated Ringers: 1375 mL    Oral Fluid: 200 mL  Total IN: 2575 mL    OUT:    Blood Loss (mL): 200 mL    Incontinent per Collection Bag (mL): 500 mL    Voided (mL): 500 mL  Total OUT: 1200 mL    Total NET: 1375 mL                      PE:   LLE: Dressing changed, incision clean and dry, no erythema or drainage, RAOUL intact SILT distally, (+2) DP/PT pulses, EHL/FHL/TA intact, Capillary refill < 2 seconds. negative calf tenderness PAS on,    A: 73y year old Female s/p left TKR POD#1    Plan:   -DVT ppx = apixaban 2.5 milliGRAM(s) Oral every 12 hours    -PT/OT = OOB  -Pain control   -Medicine to follow   -continue to follow Labs  -continue use of PAS  -Dispo: home care

## 2023-12-12 NOTE — DISCHARGE NOTE NURSING/CASE MANAGEMENT/SOCIAL WORK - NSDCPEFALRISK_GEN_ALL_CORE
For information on Fall & Injury Prevention, visit: [email or web address] OR  [email or web address] OR  https://www.cdc.gov/steadi/patient.html For information on Fall & Injury Prevention, visit: [email or web address] OR  [email or web address] OR  https://www.cdc.gov/steadi/patient.html

## 2023-12-12 NOTE — PROGRESS NOTE ADULT - SUBJECTIVE AND OBJECTIVE BOX
Date of Service 12-12-23 @ 19:03    Patient is a 73y old  Female who presents with a chief complaint of TKA (11 Dec 2023 16:30)      INTERVAL /OVERNIGHT EVENTS: pain tolerable    MEDICATIONS  (STANDING):  acetaminophen     Tablet .. 1000 milliGRAM(s) Oral every 8 hours  apixaban 2.5 milliGRAM(s) Oral every 12 hours  atorvastatin 80 milliGRAM(s) Oral at bedtime  celecoxib 200 milliGRAM(s) Oral every 12 hours  lactated ringers. 100 milliLiter(s) (150 mL/Hr) IV Continuous <Continuous>  metoprolol succinate ER 50 milliGRAM(s) Oral daily  pantoprazole    Tablet 40 milliGRAM(s) Oral before breakfast  polyethylene glycol 3350 17 Gram(s) Oral at bedtime  senna 2 Tablet(s) Oral at bedtime  sertraline 50 milliGRAM(s) Oral daily    MEDICATIONS  (PRN):  aluminum hydroxide/magnesium hydroxide/simethicone Suspension 30 milliLiter(s) Oral four times a day PRN Indigestion  HYDROmorphone  Injectable 0.5 milliGRAM(s) IV Push every 3 hours PRN Breakthrough pain  magnesium hydroxide Suspension 30 milliLiter(s) Oral daily PRN Constipation  ondansetron Injectable 4 milliGRAM(s) IV Push every 6 hours PRN Nausea and/or Vomiting  oxyCODONE    IR 5 milliGRAM(s) Oral every 3 hours PRN Moderate Pain (4 - 6)  oxyCODONE    IR 10 milliGRAM(s) Oral every 3 hours PRN Severe Pain (7 - 10)      Allergies    No Known Allergies    Intolerances        REVIEW OF SYSTEMS:  CONSTITUTIONAL: No fever, weight loss, or fatigue  EYES: No eye pain, visual disturbances, or discharge  ENMT:  No difficulty hearing, tinnitus, vertigo; No sinus or throat pain  NECK: No pain or stiffness  RESPIRATORY: No cough, wheezing, chills or hemoptysis; No shortness of breath  CARDIOVASCULAR: No chest pain, palpitations, dizziness, or leg swelling  GASTROINTESTINAL: No abdominal or epigastric pain. No nausea, vomiting, or hematemesis; No diarrhea or constipation. No melena or hematochezia.  GENITOURINARY: No dysuria, frequency, hematuria, or incontinence  NEUROLOGICAL: No headaches, memory loss, loss of strength, numbness, or tremors  SKIN: No itching, burning, rashes, or lesions   LYMPH NODES: No enlarged glands  ENDOCRINE: No heat or cold intolerance; No hair loss; No polydipsia or polyuria  MUSCULOSKELETAL: + joint pain or swelling; No muscle, back, or extremity pain  PSYCHIATRIC: No depression, anxiety, mood swings, or difficulty sleeping  HEME/LYMPH: No easy bruising, or bleeding gums  ALLERGY AND IMMUNOLOGIC: No hives or eczema    Vital Signs Last 24 Hrs  T(C): 36.9 (12 Dec 2023 12:45), Max: 36.9 (12 Dec 2023 12:45)  T(F): 98.5 (12 Dec 2023 12:45), Max: 98.5 (12 Dec 2023 12:45)  HR: 68 (12 Dec 2023 12:45) (66 - 71)  BP: 121/61 (12 Dec 2023 12:45) (117/64 - 125/69)  BP(mean): --  RR: 18 (12 Dec 2023 12:45) (16 - 18)  SpO2: 95% (12 Dec 2023 12:45) (95% - 97%)    Parameters below as of 12 Dec 2023 12:45  Patient On (Oxygen Delivery Method): room air        PHYSICAL EXAM:  GENERAL: NAD, well-groomed, well-developed  HEAD:  Atraumatic, Normocephalic  EYES: EOMI, PERRLA, conjunctiva and sclera clear  ENMT: No tonsillar erythema, exudates, or enlargement; Moist mucous membranes, Good dentition, No lesions  NECK: Supple, No JVD, Normal thyroid  NERVOUS SYSTEM:  Alert & Oriented X3, Good concentration; Motor Strength 5/5 B/L upper and lower extremities; DTRs 2+ intact and symmetric  CHEST/LUNG: Clear to auscultation bilaterally; No rales, rhonchi, wheezing, or rubs  HEART: Regular rate and rhythm; No murmurs, rubs, or gallops  ABDOMEN: Soft, Nontender, Nondistended; Bowel sounds present  EXTREMITIES:  2+ Peripheral Pulses, No clubbing, cyanosis, or edema  LYMPH: No lymphadenopathy noted  SKIN: No rashes or lesions    LABS:                        11.4   x     )-----------( x        ( 12 Dec 2023 12:00 )             34.8     12 Dec 2023 06:00    141    |  108    |  16     ----------------------------<  242    4.0     |  26     |  0.75     Ca    9.2        12 Dec 2023 06:00        Urinalysis Basic - ( 12 Dec 2023 06:00 )    Color: x / Appearance: x / SG: x / pH: x  Gluc: 242 mg/dL / Ketone: x  / Bili: x / Urobili: x   Blood: x / Protein: x / Nitrite: x   Leuk Esterase: x / RBC: x / WBC x   Sq Epi: x / Non Sq Epi: x / Bacteria: x      CAPILLARY BLOOD GLUCOSE          RADIOLOGY & ADDITIONAL TESTS:    Notes Reviewed:  [x ] YES  [ ] NO    Care Discussed with Consultants/Other Providers [x ] YES  [ ] NO

## 2023-12-12 NOTE — CASE MANAGEMENT PROGRESS NOTE - NSCMPROGRESSNOTE_GEN_ALL_CORE
The patient has a mobility limitation that significantly impairs the ability to participate in one or more mobility related activities of daily living in the home. The patient is able to safely use the rolling walker. The functional mobility deficit can be sufficiently resolved by use of rolling walker.   The patient is confined to one level of the home environment and there is no toilet on that level.

## 2023-12-12 NOTE — CARE COORDINATION ASSESSMENT. - OTHER PERTINENT DISCHARGE PLANNING INFORMATION:
RN FRANCIE met with pt. for assessment; transition of care planning at bedside, lives with spouse in private home; A&O x4; 2STE to enter; 11STE to bedroom;  CM role and Dc planning process explained; verbalized understanding.  Pt. reports; ambulates on own power; independent stairs in ADLs/ iADLs prior to s/p; RW, commode delivered at bedside by [facility];   Transition of Care Plan  DC to home; spouse Chavez will assume care; home care with [facility] per pt. choice; to f/u with HENOK MUNOZ MD post DC; will arrange own transport at DC.  NO DME NEEDS;

## 2023-12-12 NOTE — CHART NOTE - NSCHARTNOTEFT_GEN_A_CORE
Do you have Advance Directives (HCP / LV / Organ donation / Documentation of oral advance Directive):   (  x  )  yes    (      )    NO                                                                            Do you have LV - Living will :                                                                                                                                             (  x  )  yes    (      )   No    Do you have HCP - Health Care Proxy:                                                                                                                            (   x  )  yes   (       ) N0    Do you have DNR- Do Not Resuscitate :                                                                                                                           (      )  yes  (    x    )  No    Do you have DNI- Do Not intubate  :                                                                                                                               (      )  yes   (    x   ) No    Do you have MOLST - Medical orders for Life sustaining treatment  :                                                                    (      ) yes    (     x  ) No    Decision Maker :  (   x  ) Patient     (      )  HCA   (     ) Public Health Law Surrogate     (      ) Surrogate  (       ) Guardian    Goals of Care :  (   x   )   Complete Care     (       ) No Limitations                              (       )   Comfort Care       (       )  Hospice                               (      )   Limited medical Intervention / s    Medical Interventions :   (     x   )   CPR       (        )  DNR                                               (    x    )  Intubation with MV - Mechanical Ventilation  (    x  ) BIPAP/CPAP    (         )   DNI                                               (      x   )  Artificial Nutrition -  IVF, TPN / PPN, Tube Feeds             (         )   No Feeding Tube                                                (    x    ) Use Antibiotics                         (          ) No Antibiotics                                                (     x    ) Blood and Blood Products     (         )   No Blood or Blood products                                                (    x      )  Dialysis                                    (         )  No Dialysis                                                (          )  Medical Management only  (         )  No Invasive Interventions or Surgery  Time spent :                        (      x ) upto 30 minutes                       (           )   more than 30 minutes  ACP reviewed and discussed

## 2023-12-12 NOTE — DISCHARGE NOTE NURSING/CASE MANAGEMENT/SOCIAL WORK - PATIENT PORTAL LINK FT
You can access the FollowMyHealth Patient Portal offered by [facility] by registering at the following website: [email or web address]. By joining [employer]’s FollowMyHealth portal, you will also be able to view your health information using other applications (apps) compatible with our system. You can access the FollowMyHealth Patient Portal offered by [facility] by registering at the following website: [email or web address]. By joining [employer]’s FollowMyHealth portal, you will also be able to view your health information using other applications (apps) compatible with our system.

## 2023-12-12 NOTE — CARE COORDINATION ASSESSMENT. - NSPASTMEDSURGHISTORY_GEN_ALL_CORE_FT
PAST MEDICAL & SURGICAL HISTORY:  Hiatal hernia      GERD (gastroesophageal reflux disease)      H/O valvular heart disease      Pulmonary HTN      HLD (hyperlipidemia)      HTN (hypertension)      S/P hysterectomy      History of posttraumatic stress disorder (PTSD)      Anxiety      Chronic neck pain      Lumbar stenosis      Kidney stones      H/O hearing loss      Osteoarthritis      H/O lithotripsy      S/P tonsillectomy      S/P left knee arthroscopy      S/P lumpectomy, left breast

## 2023-12-12 NOTE — PATIENT CHOICE NOTE. - NSPTCHOICENOTES_GEN_ALL_CORE
Transition of Care Plan  DC to home; spouse Chavez will assume care; home care with [facility] per pt. choice; to f/u with HENOK MUNOZ MD post DC; will arrange own transport at DC.  NO DME NEEDS;

## 2023-12-12 NOTE — PATIENT CHOICE NOTE. - NSPTCHOICESTATE_GEN_ALL_CORE
I have met with the patient and/or caregiver to discuss discharge goals and treatment plan. Patient and/or caregiver also provided with instructions on accessing the CMS Compare websites for additional information related to Post Acute Provider quality and resource use measures to assist them in evaluation of the providers and in selecting their post-acute provider of choice. Patient and caregiver were informed of the facilities that are owned and/or operated by [facility]. I have discussed with the patient the availability of in-network facilities and providers. Patient and caregiver provided with a list of post-acute providers whose services are appropriate to the discharge plans and patient needs.     For patient requiring durable medical equipment, patient and/or caregiver were informed that they have the right to request who provides the required equipment. I have met with the patient and/or caregiver to discuss discharge goals and treatment plan. Patient and/or caregiver also provided with instructions on accessing the CMS Compare websites for additional information related to Post Acute Provider quality and resource use measures to assist them in evaluation of the providers and in selecting their post-acute provider of choice. Patient and caregiver were informed of the facilities that are owned and/or operated by [facility]. I have discussed with the patient the availability of in-network facilities and providers. Patient and caregiver provided with a list of post-acute providers whose services are appropriate to the discharge plans and patient needs.     For patient requiring durable medical equipment, patient and/or caregiver were informed that they have the right to request who provides the required equipment.

## 2023-12-12 NOTE — DISCHARGE NOTE NURSING/CASE MANAGEMENT/SOCIAL WORK - NSSCNAMETXT_GEN_ALL_CORE
[facility] care agency [phone] will reach out to you within 24-72 hours of your discharge to schedule home care visit/eval appointment with you. Please call agency for any queries regarding home care services  [facility] care agency [phone] will reach out to you within 24-72 hours of your discharge to schedule home care visit/eval appointment with you. Please call agency for any queries regarding home care services

## 2023-12-13 ENCOUNTER — TRANSCRIPTION ENCOUNTER (OUTPATIENT)
Age: 73
End: 2023-12-13

## 2023-12-15 ENCOUNTER — TRANSCRIPTION ENCOUNTER (OUTPATIENT)
Age: 73
End: 2023-12-15

## 2023-12-18 ENCOUNTER — NON-APPOINTMENT (OUTPATIENT)
Age: 73
End: 2023-12-18

## 2023-12-18 ENCOUNTER — TRANSCRIPTION ENCOUNTER (OUTPATIENT)
Age: 73
End: 2023-12-18

## 2023-12-22 ENCOUNTER — APPOINTMENT (OUTPATIENT)
Dept: ORTHOPEDIC SURGERY | Facility: CLINIC | Age: 73
End: 2023-12-22
Payer: MEDICARE

## 2023-12-22 VITALS — HEIGHT: 65 IN | WEIGHT: 175 LBS | BODY MASS INDEX: 29.16 KG/M2

## 2023-12-22 PROBLEM — M19.90 UNSPECIFIED OSTEOARTHRITIS, UNSPECIFIED SITE: Chronic | Status: ACTIVE | Noted: 2023-11-28

## 2023-12-22 PROBLEM — F41.9 ANXIETY DISORDER, UNSPECIFIED: Chronic | Status: ACTIVE | Noted: 2023-11-28

## 2023-12-22 PROBLEM — N20.0 CALCULUS OF KIDNEY: Chronic | Status: ACTIVE | Noted: 2023-11-28

## 2023-12-22 PROBLEM — M48.061 SPINAL STENOSIS, LUMBAR REGION WITHOUT NEUROGENIC CLAUDICATION: Chronic | Status: ACTIVE | Noted: 2023-11-28

## 2023-12-22 PROBLEM — M54.2 CERVICALGIA: Chronic | Status: ACTIVE | Noted: 2023-11-28

## 2023-12-22 PROBLEM — Z86.69 PERSONAL HISTORY OF OTHER DISEASES OF THE NERVOUS SYSTEM AND SENSE ORGANS: Chronic | Status: ACTIVE | Noted: 2023-11-28

## 2023-12-22 PROBLEM — Z86.59 PERSONAL HISTORY OF OTHER MENTAL AND BEHAVIORAL DISORDERS: Chronic | Status: ACTIVE | Noted: 2023-11-28

## 2023-12-22 PROCEDURE — 73562 X-RAY EXAM OF KNEE 3: CPT | Mod: LT

## 2023-12-22 PROCEDURE — 99024 POSTOP FOLLOW-UP VISIT: CPT

## 2023-12-22 NOTE — IMAGING
[Left] : left knee [AP] : anteroposterior [Lateral] : lateral [[place]] : skyline [Components well fixed, in good position] : Components well fixed, in good position

## 2023-12-22 NOTE — PHYSICAL EXAM
[Left] : left knee [] : patient ambulates without assistive device [FreeTextEntry3] : LLE SWELLING.  [de-identified] : WAS NOT ASSESSED.  [TWNoteComboBox7] : flexion 100 degrees [de-identified] : extension 0 degrees

## 2023-12-22 NOTE — ASSESSMENT
[FreeTextEntry1] : S/P LT TKA 12/11/23: NO F/C/S. DOING WELL. XRAYS REVIEWED WITH COMPONENTS WELL FIXED. NO SIGNS OF INFECTION. GOOD LIGAMENT BALANCE ON EXAM.  DISCUSSED THE IMPORTANCE OF ELEVATION TO REDUCE SWELLING. PROPER ELEVATION TECHNIQUES DISCUSSED. ABX AND PRECAUTIONS REVIEWED. PT RX. QUESTIONS ANSWERED.

## 2023-12-22 NOTE — HISTORY OF PRESENT ILLNESS
[Gradual] : gradual [5] : 5 [4] : 4 [Localized] : localized [Tightness] : tightness [Intermittent] : intermittent [Household chores] : household chores [Rest] : rest [Walking] : walking [] : yes [de-identified] : 12/22/2023 PT PRESENTS HERE TODAY  FOR FIRST POST OP VISIT S/P LEFT TKA DONE 12/11/23 BY DR MUNOZ DOING WELL SOME STIFFNESS  [FreeTextEntry1] : LEFT KNEE  [FreeTextEntry5] : NO INJURY [FreeTextEntry6] : STIFFNESS,TIGHTNESS [de-identified] : SX

## 2023-12-28 ENCOUNTER — TRANSCRIPTION ENCOUNTER (OUTPATIENT)
Age: 73
End: 2023-12-28

## 2023-12-30 ENCOUNTER — TRANSCRIPTION ENCOUNTER (OUTPATIENT)
Age: 73
End: 2023-12-30

## 2024-01-04 ENCOUNTER — TRANSCRIPTION ENCOUNTER (OUTPATIENT)
Age: 74
End: 2024-01-04

## 2024-01-23 ENCOUNTER — NON-APPOINTMENT (OUTPATIENT)
Age: 74
End: 2024-01-23

## 2024-02-02 ENCOUNTER — APPOINTMENT (OUTPATIENT)
Dept: ORTHOPEDIC SURGERY | Facility: CLINIC | Age: 74
End: 2024-02-02
Payer: MEDICARE

## 2024-02-02 PROCEDURE — 99024 POSTOP FOLLOW-UP VISIT: CPT

## 2024-02-02 RX ORDER — CELECOXIB 200 MG/1
200 CAPSULE ORAL DAILY
Qty: 30 | Refills: 1 | Status: ACTIVE | COMMUNITY
Start: 2024-02-02 | End: 1900-01-01

## 2024-02-02 RX ORDER — AMOXICILLIN 500 MG/1
500 TABLET, FILM COATED ORAL
Qty: 12 | Refills: 2 | Status: ACTIVE | COMMUNITY
Start: 2024-02-02 | End: 1900-01-01

## 2024-02-02 NOTE — HISTORY OF PRESENT ILLNESS
5] : 5 [4] : 4 [Localized] : localized [Tightness] : tightness [Intermittent] : intermittent [Household chores] : household chores [Rest] : rest [Walking] : walking [] : yes [de-identified] : 12/22/2023 PT PRESENTS HERE TODAY  FOR FIRST POST OP VISIT S/P LEFT TKA DONE 12/11/23 BY DR MUNOZ DOING WELL SOME STIFFNESS   02/02/24  follow up s/p left tka doing well , doing pt, having swelling mostly posterior  [FreeTextEntry1] : LEFT KNEE  [FreeTextEntry5] : NO INJURY [FreeTextEntry6] : STIFFNESS,TIGHTNESS [de-identified] : SX physical therapy

## 2024-02-02 NOTE — IMAGING
[Left] : left knee [AP] : anteroposterior [Lateral] : lateral [[place]] : skyline [Components well fixed, in good position] : Components well fixed, in good position

## 2024-02-02 NOTE — ASSESSMENT
[FreeTextEntry1] : S/P LT TKA 12/11/23: NO F/C/S. DOING WELL. XRAYS REVIEWED WITH COMPONENTS WELL FIXED. NO SIGNS OF INFECTION. GOOD LIGAMENT BALANCE ON EXAM.  DISCUSSED THE IMPORTANCE OF ELEVATION TO REDUCE SWELLING. PROPER ELEVATION TECHNIQUES DISCUSSED. ABX AND PRECAUTIONS REVIEWED. PT RX. QUESTIONS ANSWERED.   2/2/24- she continues to improve. will renew her therapy and celebrex

## 2024-02-02 NOTE — PHYSICAL EXAM
[Left] : left knee [] : no tenderness [FreeTextEntry3] : LLE SWELLING.  [de-identified] : WAS NOT ASSESSED.  [TWNoteComboBox7] : flexion 100 degrees [de-identified] : extension 0 degrees

## 2024-02-06 ENCOUNTER — TRANSCRIPTION ENCOUNTER (OUTPATIENT)
Age: 74
End: 2024-02-06

## 2024-02-20 ENCOUNTER — RX RENEWAL (OUTPATIENT)
Age: 74
End: 2024-02-20

## 2024-02-20 RX ORDER — ROSUVASTATIN CALCIUM 20 MG/1
20 TABLET, FILM COATED ORAL DAILY
Qty: 90 | Refills: 2 | Status: ACTIVE | COMMUNITY
Start: 2019-06-21

## 2024-03-02 NOTE — PATIENT PROFILE ADULT - LEGAL HELP
Pt came into ED d/t CP that has been going on for the past x10 minutes   Pt denies any fevers  Pt is alert and oriented x4   no

## 2024-03-06 ENCOUNTER — TRANSCRIPTION ENCOUNTER (OUTPATIENT)
Age: 74
End: 2024-03-06

## 2024-03-18 ENCOUNTER — APPOINTMENT (OUTPATIENT)
Dept: ORTHOPEDIC SURGERY | Facility: CLINIC | Age: 74
End: 2024-03-18
Payer: MEDICARE

## 2024-03-18 VITALS — BODY MASS INDEX: 29.16 KG/M2 | WEIGHT: 175 LBS | HEIGHT: 65 IN

## 2024-03-18 DIAGNOSIS — M79.89 OTHER SPECIFIED SOFT TISSUE DISORDERS: ICD-10-CM

## 2024-03-18 DIAGNOSIS — Z00.00 ENCOUNTER FOR GENERAL ADULT MEDICAL EXAMINATION W/OUT ABNORMAL FINDINGS: ICD-10-CM

## 2024-03-18 PROCEDURE — 99213 OFFICE O/P EST LOW 20 MIN: CPT

## 2024-03-18 PROCEDURE — 73080 X-RAY EXAM OF ELBOW: CPT | Mod: LT

## 2024-03-18 NOTE — ASSESSMENT
[FreeTextEntry1] : Seems like a lipoma in antecubital fossa Needs MRI for evaluation of soft tissue mass

## 2024-03-18 NOTE — HISTORY OF PRESENT ILLNESS
[de-identified] : 3/18/24: 73yo RHD F with left elbow pain for the past week with no injury. Has some pain with elbow flexion. Denies f/c/s.  [FreeTextEntry1] : left elbow

## 2024-03-20 ENCOUNTER — APPOINTMENT (OUTPATIENT)
Dept: MRI IMAGING | Facility: CLINIC | Age: 74
End: 2024-03-20
Payer: MEDICARE

## 2024-03-20 PROCEDURE — 73221 MRI JOINT UPR EXTREM W/O DYE: CPT | Mod: LT,MH

## 2024-03-28 ENCOUNTER — APPOINTMENT (OUTPATIENT)
Dept: ORTHOPEDIC SURGERY | Facility: CLINIC | Age: 74
End: 2024-03-28
Payer: MEDICARE

## 2024-03-28 VITALS — BODY MASS INDEX: 29.16 KG/M2 | WEIGHT: 175 LBS | HEIGHT: 65 IN

## 2024-03-28 DIAGNOSIS — D17.22 BENIGN LIPOMATOUS NEOPLASM OF SKIN AND SUBCUTANEOUS TISSUE OF LEFT ARM: ICD-10-CM

## 2024-03-28 PROCEDURE — 99213 OFFICE O/P EST LOW 20 MIN: CPT

## 2024-03-28 NOTE — ASSESSMENT
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 If painful, enlarging can consider excising, otherwise safe to just observe

## 2024-03-28 NOTE — DATA REVIEWED
[MRI] : MRI [Left] : left [Elbow] : elbow [I reviewed the films/CD and agree] : I reviewed the films/CD and agree

## 2024-03-28 NOTE — HISTORY OF PRESENT ILLNESS
[de-identified] : 3/18/24: 75yo RHD F with left elbow pain for the past week with no injury. Has some pain with elbow flexion. Denies f/c/s.  [FreeTextEntry1] : left elbow

## 2024-04-30 ENCOUNTER — RX RENEWAL (OUTPATIENT)
Age: 74
End: 2024-04-30

## 2024-05-03 ENCOUNTER — APPOINTMENT (OUTPATIENT)
Dept: ORTHOPEDIC SURGERY | Facility: CLINIC | Age: 74
End: 2024-05-03
Payer: MEDICARE

## 2024-05-03 PROCEDURE — 99213 OFFICE O/P EST LOW 20 MIN: CPT

## 2024-05-03 PROCEDURE — 73562 X-RAY EXAM OF KNEE 3: CPT | Mod: LT

## 2024-05-03 NOTE — IMAGING
[Left] : left knee [AP] : anteroposterior [Lateral] : lateral [[place]] : skyline [Components well fixed, in good position] : Components well fixed, in good position

## 2024-05-05 ENCOUNTER — APPOINTMENT (OUTPATIENT)
Dept: MRI IMAGING | Facility: CLINIC | Age: 74
End: 2024-05-05
Payer: MEDICARE

## 2024-05-05 PROCEDURE — 73721 MRI JNT OF LWR EXTRE W/O DYE: CPT | Mod: LT,MH

## 2024-05-07 NOTE — HISTORY OF PRESENT ILLNESS
[Gradual] : gradual [5] : 5 [4] : 4 [Localized] : localized [Tightness] : tightness [Intermittent] : intermittent [Household chores] : household chores [Rest] : rest [Walking] : walking [] : yes [de-identified] : 12/22/2023 PT PRESENTS HERE TODAY  FOR FIRST POST OP VISIT S/P LEFT TKA DONE 12/11/23 BY DR MUNOZ DOING WELL SOME STIFFNESS   02/02/24  follow up s/p left tka doing well , doing pt, having swelling mostly posterior   5.3.24 PATIENT HERE FOR LEFT KNEE PAIN. [FreeTextEntry1] : LEFT KNEE  [FreeTextEntry5] : NO INJURY [FreeTextEntry6] : STIFFNESS,TIGHTNESS [de-identified] : SX physical therapy

## 2024-05-07 NOTE — ASSESSMENT
[FreeTextEntry1] : S/P LT TKA 12/11/23: NO F/C/S. DOING WELL. XRAYS REVIEWED WITH COMPONENTS WELL FIXED. NO SIGNS OF INFECTION. GOOD LIGAMENT BALANCE ON EXAM.  ABX AND PRECAUTIONS REVIEWED. PT RX. QUESTIONS ANSWERED.   WILL ORDER LT KNEE MRI WITH MARS TO EVAL FOR POP CYST, HAMSTRING STRAIN

## 2024-05-07 NOTE — PHYSICAL EXAM
[Left] : left knee [] : no tenderness [FreeTextEntry3] : LLE SWELLING.  [de-identified] : WAS NOT ASSESSED.  [TWNoteComboBox7] : flexion 100 degrees [de-identified] : extension 0 degrees

## 2024-05-13 ENCOUNTER — RX RENEWAL (OUTPATIENT)
Age: 74
End: 2024-05-13

## 2024-05-13 RX ORDER — OLMESARTAN MEDOXOMIL 20 MG/1
20 TABLET, FILM COATED ORAL
Qty: 90 | Refills: 3 | Status: ACTIVE | COMMUNITY
Start: 2019-01-08

## 2024-05-21 ENCOUNTER — APPOINTMENT (OUTPATIENT)
Dept: ORTHOPEDIC SURGERY | Facility: CLINIC | Age: 74
End: 2024-05-21
Payer: MEDICARE

## 2024-05-21 VITALS — BODY MASS INDEX: 29.16 KG/M2 | HEIGHT: 65 IN | WEIGHT: 175 LBS

## 2024-05-21 DIAGNOSIS — Z96.652 PRESENCE OF LEFT ARTIFICIAL KNEE JOINT: ICD-10-CM

## 2024-05-21 PROCEDURE — 99213 OFFICE O/P EST LOW 20 MIN: CPT

## 2024-05-21 PROCEDURE — G2211 COMPLEX E/M VISIT ADD ON: CPT

## 2024-05-21 NOTE — HISTORY OF PRESENT ILLNESS
[Gradual] : gradual [5] : 5 [4] : 4 [Localized] : localized [Tightness] : tightness [Intermittent] : intermittent [Household chores] : household chores [Rest] : rest [Walking] : walking [] : yes [de-identified] : 12/22/2023 PT PRESENTS HERE TODAY  FOR FIRST POST OP VISIT S/P LEFT TKA DONE 12/11/23 BY DR MUNOZ DOING WELL SOME STIFFNESS   02/02/24  follow up s/p left tka doing well , doing pt, having swelling mostly posterior   5.3.24 PATIENT HERE FOR LEFT KNEE PAIN.  5/21/24: Here for left knee MRI results.  [FreeTextEntry1] : LEFT KNEE  [FreeTextEntry5] : NO INJURY [FreeTextEntry6] : STIFFNESS,TIGHTNESS [de-identified] : SX physical therapy

## 2024-05-21 NOTE — ASSESSMENT
[FreeTextEntry1] : S/P LT TKA 12/11/23: NO F/C/S. DOING WELL. XRAYS REVIEWED WITH COMPONENTS WELL FIXED. NO SIGNS OF INFECTION. GOOD LIGAMENT BALANCE ON EXAM.  ABX AND PRECAUTIONS REVIEWED. QUESTIONS ANSWERED.   LT KNEE MRI REVIEWED WNL S/P LT TKA.

## 2024-05-21 NOTE — IMAGING
[Left] : left knee [AP] : anteroposterior [Lateral] : lateral [[place]] : skyline [Components well fixed, in good position] : Components well fixed, in good position

## 2024-05-21 NOTE — PHYSICAL EXAM
[Left] : left knee [] : no tenderness [FreeTextEntry3] : LLE SWELLING.  [de-identified] : WAS NOT ASSESSED.  [TWNoteComboBox7] : flexion 100 degrees [de-identified] : extension 0 degrees

## 2024-05-21 NOTE — DATA REVIEWED
[FreeTextEntry1] : LEFT KNEE MRI [facility] 5/5/24: 1. No MRI evidence of osteolysis, particle disease, large loose body, surrounding fluid collection, or adverse soft tissue reaction.  2. Moderate extensor mechanism tendinopathy, mild infrapatellar fat pad scarring, mild peripatellar scar with slight effusion.  3. Mild disproportionate quadriceps muscle atrophy.

## 2024-05-24 ENCOUNTER — NON-APPOINTMENT (OUTPATIENT)
Age: 74
End: 2024-05-24

## 2024-05-28 ENCOUNTER — APPOINTMENT (OUTPATIENT)
Dept: ORTHOPEDIC SURGERY | Facility: CLINIC | Age: 74
End: 2024-05-28
Payer: MEDICARE

## 2024-05-28 VITALS — BODY MASS INDEX: 29.16 KG/M2 | HEIGHT: 65 IN | WEIGHT: 175 LBS

## 2024-05-28 DIAGNOSIS — M17.11 UNILATERAL PRIMARY OSTEOARTHRITIS, RIGHT KNEE: ICD-10-CM

## 2024-05-28 DIAGNOSIS — M77.11 LATERAL EPICONDYLITIS, RIGHT ELBOW: ICD-10-CM

## 2024-05-28 PROCEDURE — 73080 X-RAY EXAM OF ELBOW: CPT | Mod: RT

## 2024-05-28 PROCEDURE — 73562 X-RAY EXAM OF KNEE 3: CPT | Mod: RT

## 2024-05-28 PROCEDURE — 99214 OFFICE O/P EST MOD 30 MIN: CPT | Mod: 25

## 2024-05-28 PROCEDURE — 20610 DRAIN/INJ JOINT/BURSA W/O US: CPT | Mod: RT

## 2024-05-28 PROCEDURE — 20551 NJX 1 TENDON ORIGIN/INSJ: CPT | Mod: 59,RT

## 2024-05-28 NOTE — PHYSICAL EXAM
[Right] : right knee [NL (0)] : extension 0 degrees [5___] : hamstring 5[unfilled]/5 [] : negative Lachmann [Equivocal] : equivocal [place] [TWNoteComboBox7] : flexion 125 degrees

## 2024-05-28 NOTE — ASSESSMENT
[FreeTextEntry1] : Will try intraarticular injection right knee- if doesn't relieve pain, may need MRI to r/o insufficiency fracture Will inject right LE

## 2024-05-28 NOTE — PROCEDURE
[Large Joint Injection] : Large joint injection [Knee] : knee [Tendon Origin] : tendon origin [Right] : of the right [Lateral Epicondyle] : lateral epicondyle [Pain] : pain [Alcohol] : alcohol [Betadine] : betadine [Ethyl Chloride sprayed topically] : ethyl chloride sprayed topically [Sterile technique used] : sterile technique used [___ cc    3mg] :  Betamethasone (Celestone) ~Vcc of 3mg [___ cc    1%] : Lidocaine ~Vcc of 1%

## 2024-05-28 NOTE — IMAGING
[There are no fractures, subluxations or dislocations. No significant abnormalities are seen] : There are no fractures, subluxations or dislocations. No significant abnormalities are seen [Right] : right knee [Mild tricompartmental OA medial narrowing] : Mild tricompartmental OA medial narrowing

## 2024-05-28 NOTE — HISTORY OF PRESENT ILLNESS
[7] : 7 [0] : 0 [Sharp] : sharp [Constant] : constant [Household chores] : household chores [Leisure] : leisure [Nothing helps with pain getting better] : Nothing helps with pain getting better [de-identified] : Has sharp pain right knee over past week. No new injury. No clicking, locking or giving way Also moving soon, has been doing a bit of packing, has sharp pain outer side right elbow [] : no [FreeTextEntry1] : Right elbow, right knee [FreeTextEntry5] : Right knee pain is chronic, present for several years. Last Thursday pain became excruciating. No known injury. Right elbow pain started 2 weeks ago. No known injury. Weakness [FreeTextEntry7] : Numbness in right forearm  [de-identified] : Changing positions

## 2024-07-12 NOTE — HISTORY OF PRESENT ILLNESS
Detail Level: Detailed
[FreeTextEntry1] : Mrs. Flakita Lowry presented to the office today for follow-up cardiac evaluation.\par \par The patient is a 69-year-old female with a history of labile hypertension, a dyslipidemia, a right bundle branch block pattern on her electrocardiogram (discovered on a routine tracing performed at her internist's office on 5/26/11), mild carotid atherosclerosis, ventricular premature contractions, palpitations (suppressed with beta-blocker therapy), atypical chest discomfort (attributed to GERD), GERD, a hiatal hernia, insomnia, and vitamin D deficiency. I last evaluated the patient in the office on 4/30/19.\par \par At the time of a previous visit here on 12/11/18, I instructed the patient to increase her dosage of Toprol-XL from 25 mg daily to 50 mg daily, in an effort to more optimally control her blood pressure, as well as to potentially suppress her palpitations. She has been tolerating the increased dosage of the Toprol-XL, and has noted her palpitations to have significantly decreased in frequency and duration.  At the time of a follow-up visit here on 1/8/19, I added Benicar 20 mg daily to the patient's medication regimen, in an effort to control her blood pressure. The dosage of Benicar was increased to 40 mg daily at the time of a follow-up visit here on 1/30/19, noting that the patient was exhibiting a moderately elevated blood pressure reading at that time. The patient tripped on her front stoop on 3/14/19, lacerating her knee. She required suturing, and when she presented for suturing, she was noted to have a blood pressure reading of 112/43. She telephoned our office, and spoke with my associate, Dr. Smith, who instructed her to reduce the dosage of Benicar to 20 mg daily at that point.\par \par The patient has been doing well from a cardiac symptomatic standpoint since her previous visit here on 4/30/19. Specifically, she has not experienced recurrence of previously reported momentary palpitations. She has not experienced any sustained episodes of palpitations. She has not experienced chest discomfort or dyspnea on exertion in association with her activities, which include walking 3 miles daily. She has not noted orthopnea, paroxysmal nocturnal dyspnea, or lower extremity edema. She has not experienced any episodes of presyncope or syncope.\par \par A Holter monitor study performed from 5/31/18 through 6/1/18 revealed the predominant rhythm throughout the recording to be sinus rhythm at an average rate of 76 beats per minute, with a range of  beats per minute. Rare supraventricular premature contractions were exhibited, including 4 couplets. 2 brief episodes of non-sustained supraventricular tachycardia were exhibited, the longest of which was 4 beats in duration. Rare ventricular premature contractions were exhibited. No episodes of ventricular tachycardia were exhibited. No significant pauses were exhibited.\par \par Laboratory studies performed on 12/11/18 revealed cholesterol 177, triglycerides 103, HDL 62, and calculated LDL 94. The liver chemistries were normal. The BUN and creatinine were 16 and 0.66, respectively. The potassium level was 5.1. The glucose level was 99. The vitamin D level was 56.1.\par \par Previous History:\par \par At the time of a previous visit here on 12/28/15, the patient reported that over the preceding few months, she had been experiencing recurrence of randomly-occurring palpitations, described as momentary "loud bumps". Subsequent cardiac rhythm event monitoring performed from 12/29/15 through 1/4/16 revealed palpitations on 12/29/15 to have correlated with isolated ventricular premature contractions. Note that I had instructed the patient to increase her dosage of Toprol-XL from 25 mg daily to 50 mg daily at the time of a previous visit here on 12/28/15, in an effort to suppress her palpitations, as well as to more optimally control her blood pressure. I spoke with her subsequently on the telephone on 1/19/17, at which time she informed me that she had become fatigued on the higher doses of Toprol-XL, and had reduced the dosage back down to 25 mg daily, with resolution of the fatigue. I instructed her to remain on Toprol-XL 25 mg daily at that point.\par \par When I had evaluated the patient in the office on 4/22/13, she described experiencing persistent palpitations at that time, described as a sensation of a "hard beat". Her blood pressure was noted to be persistently elevated at that time as well. I prescribed Toprol-XL 25 mg daily at bedtime, in an effort to more optimally control the patient's blood pressure, as well as to suppress her palpitations. Note that I suspected that the patient's palpitations were secondary to ventricular ectopy, as a ventricular premature contraction was noted on her electrocardiogram at the time of a previous visit here on 4/1/13.\par \par Echocardiography performed on 4/10/13 revealed normal cardiac structural integrity. There was normal left ventricular systolic function, with an estimated ejection fraction of 77%. There was evidence for mildly impaired diastolic relaxation the left ventricle. Mild tricuspid regurgitation was demonstrated, without significant elevation in the pulmonary artery systolic pressure.\par \par  Carotid artery Doppler testing performed in our office on 5/29/15 revealed mild plaque formation involving the right bulbar region.\par \par   As far as risk factors for coronary artery disease are concerned, the patient has a history of a type IIa dyslipidemia, being treated with statin therapy. She has exhibited labile hypertensive readings on occasion, however, states that she does not typically exhibit hypertension. She does not have a history of diabetes. She denies any history of cigarette smoking. She does not have a known definite immediate family history of premature coronary artery disease.\par \par A  nuclear stress study performed on 5/14/09, for further evaluation of atypical chest discomfort at that time, revealed the patient to exhibit normal exercise tolerance, without the inducement of cardiac symptoms. The electrocardiographic portion of the study was considered non-diagnostic. The cardiac imaging portion of the study revealed normal left ventricular myocardial perfusion and systolic function, with a calculated ejection fraction of 80%.\par \par   Past medical history, other than previously stated, is significant for vitamin D deficiency, left knee arthroscopic surgery for treatment of a torn meniscus in September of 2011, osteoarthritis of the left knee, a left Baker's cyst, a LEATHA/BSO procedure in the 1990's for treatment of uterine fibroids, resection of a benign left breast tumor in 1988, and a tonsillectomy at age 2. The patient has had 3 full-term uncomplicated pregnancies, all delivered vaginally.
Detail Level: Generalized
Detail Level: Zone

## (undated) DEVICE — DRAPE MAYO STAND 30"

## (undated) DEVICE — PLASTIC SOLUTION BOWL 160Z

## (undated) DEVICE — SAW BLADE STRYKER SAGITTAL DUAL CUT WITH FAN

## (undated) DEVICE — PACK TOTAL KNEE

## (undated) DEVICE — SYR LUER LOK 20CC

## (undated) DEVICE — NDL HYPO SAFE 20G X 1.5" (YELLOW)

## (undated) DEVICE — SUT VICRYL 2-0 36" CT-1 UNDYED

## (undated) DEVICE — GLV 8 PROTEXIS (WHITE)

## (undated) DEVICE — GLV 8 PROTEXIS (BLUE)

## (undated) DEVICE — DRAPE IOBAN 23" X 17"

## (undated) DEVICE — WARMING BLANKET UPPER ADULT

## (undated) DEVICE — HOOD FLYTE STRYKER HELMET SHIELD

## (undated) DEVICE — SUT VICRYL 1 36" CTX UNDYED

## (undated) DEVICE — DRSG WEBRIL 6"

## (undated) DEVICE — SAW BLADE STRYKER SAGITTAL DUAL CUT 18MMX100MMX1.27MM

## (undated) DEVICE — SOL IRR BAG NS 0.9% 3000ML

## (undated) DEVICE — SAW BLADE STRYKER SAGITTAL 25X98.5X1.24MM

## (undated) DEVICE — SAW BLADE STRYKER SAGITTAL 25X89.5X0.89MM

## (undated) DEVICE — ZIMMER PULSAVAC PLUS FAN KIT

## (undated) DEVICE — DRAPE 1/2 SHEET 40X57"

## (undated) DEVICE — SUCTION YANKAUER TAPERED BULBOUS NO VENT

## (undated) DEVICE — WOUND IRR IRRISEPT W 0.5 CHG

## (undated) DEVICE — SAW BLADE STRYKER WIDE MED 25MM X 73MM X 0.89MM

## (undated) DEVICE — PREP CHLORAPREP HI-LITE ORANGE 26ML

## (undated) DEVICE — ORTHOALIGN PLUS UNIT

## (undated) DEVICE — DRAPE STERI-DRAPE INCISE 32X33"

## (undated) DEVICE — STRYKER MIXEVAC 3 BONE CEMENT MIXER

## (undated) DEVICE — HOOD T5 PEELAWAY

## (undated) DEVICE — GOWN IMPERV BREATHABLE XL

## (undated) DEVICE — DRAPE 3/4 SHEET 52X76"

## (undated) DEVICE — CRYO/CUFF GRAVITY COOLER KNEE LARGE

## (undated) DEVICE — DRSG STOCKINETTE TUBULAR COTTON 2PLY 6X72"

## (undated) DEVICE — SUT VLOC 90 4-0 18" P-12 UNDYED

## (undated) DEVICE — DRSG PICO NPWT 4X12"

## (undated) DEVICE — DRSG STOCKINETTE TUBULAR COTTON 1PLY 6X72"